# Patient Record
Sex: MALE | Race: WHITE
[De-identification: names, ages, dates, MRNs, and addresses within clinical notes are randomized per-mention and may not be internally consistent; named-entity substitution may affect disease eponyms.]

---

## 2020-01-25 ENCOUNTER — HOSPITAL ENCOUNTER (OUTPATIENT)
Dept: HOSPITAL 47 - LABWHC1 | Age: 70
Discharge: HOME | End: 2020-01-25
Attending: NURSE PRACTITIONER
Payer: MEDICARE

## 2020-01-25 DIAGNOSIS — E55.9: ICD-10-CM

## 2020-01-25 DIAGNOSIS — G47.30: ICD-10-CM

## 2020-01-25 DIAGNOSIS — R00.2: ICD-10-CM

## 2020-01-25 DIAGNOSIS — E78.00: ICD-10-CM

## 2020-01-25 DIAGNOSIS — I10: ICD-10-CM

## 2020-01-25 DIAGNOSIS — I25.10: Primary | ICD-10-CM

## 2020-01-25 DIAGNOSIS — E11.9: ICD-10-CM

## 2020-01-25 LAB
ANION GAP SERPL CALC-SCNC: 7.2 MMOL/L (ref 4–12)
BUN SERPL-SCNC: 18 MG/DL (ref 9–27)
BUN/CREAT SERPL: 22.5 RATIO (ref 12–20)
CALCIUM SPEC-MCNC: 9 MG/DL (ref 8.7–10.3)
CHLORIDE SERPL-SCNC: 106 MMOL/L (ref 96–109)
CHOLEST SERPL-MCNC: 147 MG/DL (ref 0–200)
CO2 SERPL-SCNC: 26.8 MMOL/L (ref 21.6–31.8)
ERYTHROCYTE [DISTWIDTH] IN BLOOD BY AUTOMATED COUNT: 4.92 M/UL (ref 4.3–5.9)
ERYTHROCYTE [DISTWIDTH] IN BLOOD: 12.3 % (ref 11.5–15.5)
GLUCOSE SERPL-MCNC: 149 MG/DL (ref 70–110)
HBA1C MFR BLD: 6.9 % (ref 4–6)
HCT VFR BLD AUTO: 43.1 % (ref 39–53)
HDLC SERPL-MCNC: 41 MG/DL (ref 40–60)
HGB BLD-MCNC: 14.3 GM/DL (ref 13–17.5)
LDLC SERPL CALC-MCNC: 68.2 MG/DL (ref 0–131)
MAGNESIUM SPEC-SCNC: 1.7 MG/DL (ref 1.5–2.4)
MCH RBC QN AUTO: 29.1 PG (ref 25–35)
MCHC RBC AUTO-ENTMCNC: 33.2 G/DL (ref 31–37)
MCV RBC AUTO: 87.5 FL (ref 80–100)
PLATELET # BLD AUTO: 255 K/UL (ref 150–450)
POTASSIUM SERPL-SCNC: 4.3 MMOL/L (ref 3.5–5.5)
SODIUM SERPL-SCNC: 140 MMOL/L (ref 135–145)
T4 FREE SERPL-MCNC: 1.1 NG/DL (ref 0.8–1.8)
TRIGL SERPL-MCNC: 189 MG/DL (ref 0–149)
VLDLC SERPL CALC-MCNC: 37.8 MG/DL (ref 5–40)
WBC # BLD AUTO: 6.4 K/UL (ref 3.8–10.6)

## 2020-01-25 PROCEDURE — 83735 ASSAY OF MAGNESIUM: CPT

## 2020-01-25 PROCEDURE — 84443 ASSAY THYROID STIM HORMONE: CPT

## 2020-01-25 PROCEDURE — 85027 COMPLETE CBC AUTOMATED: CPT

## 2020-01-25 PROCEDURE — 80061 LIPID PANEL: CPT

## 2020-01-25 PROCEDURE — 82306 VITAMIN D 25 HYDROXY: CPT

## 2020-01-25 PROCEDURE — 84439 ASSAY OF FREE THYROXINE: CPT

## 2020-01-25 PROCEDURE — 80048 BASIC METABOLIC PNL TOTAL CA: CPT

## 2020-01-25 PROCEDURE — 36415 COLL VENOUS BLD VENIPUNCTURE: CPT

## 2020-01-25 PROCEDURE — 83036 HEMOGLOBIN GLYCOSYLATED A1C: CPT

## 2020-08-07 ENCOUNTER — HOSPITAL ENCOUNTER (INPATIENT)
Dept: HOSPITAL 47 - EC | Age: 70
Discharge: ANOTHER HOSPITAL | DRG: 65 | End: 2020-08-07
Attending: INTERNAL MEDICINE | Admitting: INTERNAL MEDICINE
Payer: MEDICARE

## 2020-08-07 VITALS
SYSTOLIC BLOOD PRESSURE: 200 MMHG | RESPIRATION RATE: 18 BRPM | TEMPERATURE: 98.2 F | DIASTOLIC BLOOD PRESSURE: 100 MMHG | HEART RATE: 55 BPM

## 2020-08-07 DIAGNOSIS — H53.8: ICD-10-CM

## 2020-08-07 DIAGNOSIS — H42: ICD-10-CM

## 2020-08-07 DIAGNOSIS — Z79.82: ICD-10-CM

## 2020-08-07 DIAGNOSIS — J01.41: ICD-10-CM

## 2020-08-07 DIAGNOSIS — E11.42: ICD-10-CM

## 2020-08-07 DIAGNOSIS — E87.1: ICD-10-CM

## 2020-08-07 DIAGNOSIS — R40.2362: ICD-10-CM

## 2020-08-07 DIAGNOSIS — Z79.02: ICD-10-CM

## 2020-08-07 DIAGNOSIS — Z83.3: ICD-10-CM

## 2020-08-07 DIAGNOSIS — Z79.899: ICD-10-CM

## 2020-08-07 DIAGNOSIS — I11.9: ICD-10-CM

## 2020-08-07 DIAGNOSIS — H40.9: ICD-10-CM

## 2020-08-07 DIAGNOSIS — E78.5: ICD-10-CM

## 2020-08-07 DIAGNOSIS — R40.2252: ICD-10-CM

## 2020-08-07 DIAGNOSIS — R40.2142: ICD-10-CM

## 2020-08-07 DIAGNOSIS — I25.10: ICD-10-CM

## 2020-08-07 DIAGNOSIS — R47.81: ICD-10-CM

## 2020-08-07 DIAGNOSIS — R20.2: ICD-10-CM

## 2020-08-07 DIAGNOSIS — Z87.19: ICD-10-CM

## 2020-08-07 DIAGNOSIS — G45.0: ICD-10-CM

## 2020-08-07 DIAGNOSIS — R42: ICD-10-CM

## 2020-08-07 DIAGNOSIS — R29.700: ICD-10-CM

## 2020-08-07 DIAGNOSIS — G47.33: ICD-10-CM

## 2020-08-07 DIAGNOSIS — Z82.49: ICD-10-CM

## 2020-08-07 DIAGNOSIS — Z80.6: ICD-10-CM

## 2020-08-07 DIAGNOSIS — G46.3: ICD-10-CM

## 2020-08-07 DIAGNOSIS — E11.39: ICD-10-CM

## 2020-08-07 DIAGNOSIS — Z87.891: ICD-10-CM

## 2020-08-07 DIAGNOSIS — I63.213: Primary | ICD-10-CM

## 2020-08-07 DIAGNOSIS — Z79.51: ICD-10-CM

## 2020-08-07 DIAGNOSIS — R26.89: ICD-10-CM

## 2020-08-07 DIAGNOSIS — Z79.84: ICD-10-CM

## 2020-08-07 DIAGNOSIS — Z95.5: ICD-10-CM

## 2020-08-07 LAB
ALBUMIN SERPL-MCNC: 3.1 G/DL (ref 3.5–5)
ALP SERPL-CCNC: 60 U/L (ref 38–126)
ALT SERPL-CCNC: 29 U/L (ref 4–49)
ANION GAP SERPL CALC-SCNC: 4 MMOL/L
APTT BLD: 23.1 SEC (ref 22–30)
AST SERPL-CCNC: 28 U/L (ref 17–59)
BASOPHILS # BLD AUTO: 0.1 K/UL (ref 0–0.2)
BASOPHILS NFR BLD AUTO: 1 %
BUN SERPL-SCNC: 20 MG/DL (ref 9–20)
CALCIUM SPEC-MCNC: 8.8 MG/DL (ref 8.4–10.2)
CHLORIDE SERPL-SCNC: 106 MMOL/L (ref 98–107)
CO2 SERPL-SCNC: 26 MMOL/L (ref 22–30)
EOSINOPHIL # BLD AUTO: 0.2 K/UL (ref 0–0.7)
EOSINOPHIL NFR BLD AUTO: 2 %
ERYTHROCYTE [DISTWIDTH] IN BLOOD BY AUTOMATED COUNT: 5.07 M/UL (ref 4.3–5.9)
ERYTHROCYTE [DISTWIDTH] IN BLOOD: 12.6 % (ref 11.5–15.5)
GLUCOSE SERPL-MCNC: 166 MG/DL (ref 74–99)
HCT VFR BLD AUTO: 44.1 % (ref 39–53)
HGB BLD-MCNC: 14.7 GM/DL (ref 13–17.5)
INR PPP: 0.9 (ref ?–1.2)
LYMPHOCYTES # SPEC AUTO: 1.5 K/UL (ref 1–4.8)
LYMPHOCYTES NFR SPEC AUTO: 20 %
MCH RBC QN AUTO: 29 PG (ref 25–35)
MCHC RBC AUTO-ENTMCNC: 33.4 G/DL (ref 31–37)
MCV RBC AUTO: 86.9 FL (ref 80–100)
MONOCYTES # BLD AUTO: 0.7 K/UL (ref 0–1)
MONOCYTES NFR BLD AUTO: 9 %
NEUTROPHILS # BLD AUTO: 5.2 K/UL (ref 1.3–7.7)
NEUTROPHILS NFR BLD AUTO: 67 %
PLATELET # BLD AUTO: 233 K/UL (ref 150–450)
POTASSIUM SERPL-SCNC: 4.3 MMOL/L (ref 3.5–5.1)
PROT SERPL-MCNC: 5.9 G/DL (ref 6.3–8.2)
PT BLD: 9.7 SEC (ref 9–12)
SODIUM SERPL-SCNC: 136 MMOL/L (ref 137–145)
WBC # BLD AUTO: 7.8 K/UL (ref 3.8–10.6)

## 2020-08-07 PROCEDURE — 70551 MRI BRAIN STEM W/O DYE: CPT

## 2020-08-07 PROCEDURE — 80053 COMPREHEN METABOLIC PANEL: CPT

## 2020-08-07 PROCEDURE — 96360 HYDRATION IV INFUSION INIT: CPT

## 2020-08-07 PROCEDURE — 85730 THROMBOPLASTIN TIME PARTIAL: CPT

## 2020-08-07 PROCEDURE — 85610 PROTHROMBIN TIME: CPT

## 2020-08-07 PROCEDURE — 85025 COMPLETE CBC W/AUTO DIFF WBC: CPT

## 2020-08-07 PROCEDURE — 96361 HYDRATE IV INFUSION ADD-ON: CPT

## 2020-08-07 PROCEDURE — 93306 TTE W/DOPPLER COMPLETE: CPT

## 2020-08-07 PROCEDURE — 70450 CT HEAD/BRAIN W/O DYE: CPT

## 2020-08-07 PROCEDURE — 93005 ELECTROCARDIOGRAM TRACING: CPT

## 2020-08-07 PROCEDURE — 70498 CT ANGIOGRAPHY NECK: CPT

## 2020-08-07 PROCEDURE — 36415 COLL VENOUS BLD VENIPUNCTURE: CPT

## 2020-08-07 PROCEDURE — 84484 ASSAY OF TROPONIN QUANT: CPT

## 2020-08-07 PROCEDURE — 70496 CT ANGIOGRAPHY HEAD: CPT

## 2020-08-07 PROCEDURE — 99285 EMERGENCY DEPT VISIT HI MDM: CPT

## 2021-05-14 ENCOUNTER — APPOINTMENT (OUTPATIENT)
Dept: GENERAL RADIOLOGY | Age: 71
End: 2021-05-14
Payer: MEDICARE

## 2021-05-14 ENCOUNTER — HOSPITAL ENCOUNTER (EMERGENCY)
Age: 71
Discharge: HOME | End: 2021-05-15
Attending: FAMILY MEDICINE
Payer: MEDICARE

## 2021-05-14 ENCOUNTER — APPOINTMENT (OUTPATIENT)
Dept: CT IMAGING | Age: 71
End: 2021-05-14
Payer: MEDICARE

## 2021-05-14 VITALS
HEART RATE: 55 BPM | TEMPERATURE: 97.6 F | RESPIRATION RATE: 16 BRPM | HEIGHT: 69 IN | DIASTOLIC BLOOD PRESSURE: 93 MMHG | BODY MASS INDEX: 29.62 KG/M2 | OXYGEN SATURATION: 94 % | SYSTOLIC BLOOD PRESSURE: 166 MMHG | WEIGHT: 200 LBS

## 2021-05-14 DIAGNOSIS — R55 SYNCOPE AND COLLAPSE: Primary | ICD-10-CM

## 2021-05-14 LAB
ALBUMIN SERPL-MCNC: 2.1 G/DL (ref 3.5–5.1)
ALP BLD-CCNC: 51 U/L (ref 38–126)
ALT SERPL-CCNC: 30 U/L (ref 11–66)
ANION GAP SERPL CALCULATED.3IONS-SCNC: 6 MEQ/L (ref 8–16)
AST SERPL-CCNC: 32 U/L (ref 5–40)
BASOPHILS # BLD: 0.5 %
BASOPHILS ABSOLUTE: 0.1 THOU/MM3 (ref 0–0.1)
BILIRUB SERPL-MCNC: < 0.2 MG/DL (ref 0.3–1.2)
BUN BLDV-MCNC: 33 MG/DL (ref 7–22)
CALCIUM SERPL-MCNC: 8.1 MG/DL (ref 8.5–10.5)
CHLORIDE BLD-SCNC: 108 MEQ/L (ref 98–111)
CO2: 25 MEQ/L (ref 23–33)
CREAT SERPL-MCNC: 1.4 MG/DL (ref 0.4–1.2)
EOSINOPHIL # BLD: 1.9 %
EOSINOPHILS ABSOLUTE: 0.2 THOU/MM3 (ref 0–0.4)
ERYTHROCYTE [DISTWIDTH] IN BLOOD BY AUTOMATED COUNT: 13 % (ref 11.5–14.5)
ERYTHROCYTE [DISTWIDTH] IN BLOOD BY AUTOMATED COUNT: 41.9 FL (ref 35–45)
GFR SERPL CREATININE-BSD FRML MDRD: 50 ML/MIN/1.73M2
GLUCOSE BLD-MCNC: 154 MG/DL (ref 70–108)
HCT VFR BLD CALC: 41.7 % (ref 42–52)
HEMOGLOBIN: 13.7 GM/DL (ref 14–18)
IMMATURE GRANS (ABS): 0.03 THOU/MM3 (ref 0–0.07)
IMMATURE GRANULOCYTES: 0.2 %
LYMPHOCYTES # BLD: 13.4 %
LYMPHOCYTES ABSOLUTE: 1.6 THOU/MM3 (ref 1–4.8)
MCH RBC QN AUTO: 29.2 PG (ref 26–33)
MCHC RBC AUTO-ENTMCNC: 32.9 GM/DL (ref 32.2–35.5)
MCV RBC AUTO: 88.9 FL (ref 80–94)
MONOCYTES # BLD: 7.9 %
MONOCYTES ABSOLUTE: 1 THOU/MM3 (ref 0.4–1.3)
NUCLEATED RED BLOOD CELLS: 0 /100 WBC
OSMOLALITY CALCULATION: 287.9 MOSMOL/KG (ref 275–300)
PLATELET # BLD: 286 THOU/MM3 (ref 130–400)
PMV BLD AUTO: 11.2 FL (ref 9.4–12.4)
POTASSIUM REFLEX MAGNESIUM: 4.6 MEQ/L (ref 3.5–5.2)
PRO-BNP: 1861 PG/ML (ref 0–900)
RBC # BLD: 4.69 MILL/MM3 (ref 4.7–6.1)
SEG NEUTROPHILS: 76.1 %
SEGMENTED NEUTROPHILS ABSOLUTE COUNT: 9.2 THOU/MM3 (ref 1.8–7.7)
SODIUM BLD-SCNC: 139 MEQ/L (ref 135–145)
TOTAL PROTEIN: 5.1 G/DL (ref 6.1–8)
TROPONIN T: 0.05 NG/ML
WBC # BLD: 12.1 THOU/MM3 (ref 4.8–10.8)

## 2021-05-14 PROCEDURE — 80053 COMPREHEN METABOLIC PANEL: CPT

## 2021-05-14 PROCEDURE — 99285 EMERGENCY DEPT VISIT HI MDM: CPT

## 2021-05-14 PROCEDURE — 85025 COMPLETE CBC W/AUTO DIFF WBC: CPT

## 2021-05-14 PROCEDURE — 36415 COLL VENOUS BLD VENIPUNCTURE: CPT

## 2021-05-14 PROCEDURE — 71045 X-RAY EXAM CHEST 1 VIEW: CPT

## 2021-05-14 PROCEDURE — 82550 ASSAY OF CK (CPK): CPT

## 2021-05-14 PROCEDURE — 83880 ASSAY OF NATRIURETIC PEPTIDE: CPT

## 2021-05-14 PROCEDURE — 84484 ASSAY OF TROPONIN QUANT: CPT

## 2021-05-14 PROCEDURE — 93005 ELECTROCARDIOGRAM TRACING: CPT | Performed by: FAMILY MEDICINE

## 2021-05-14 PROCEDURE — 70450 CT HEAD/BRAIN W/O DYE: CPT

## 2021-05-14 RX ORDER — LISINOPRIL 10 MG/1
10 TABLET ORAL 2 TIMES DAILY
COMMUNITY

## 2021-05-14 RX ORDER — ATENOLOL 25 MG/1
25 TABLET ORAL 2 TIMES DAILY
COMMUNITY

## 2021-05-14 RX ORDER — M-VIT,TX,IRON,MINS/CALC/FOLIC 27MG-0.4MG
1 TABLET ORAL DAILY
COMMUNITY

## 2021-05-14 RX ORDER — ATORVASTATIN CALCIUM 80 MG/1
80 TABLET, FILM COATED ORAL NIGHTLY
COMMUNITY

## 2021-05-14 RX ORDER — ASPIRIN 81 MG/1
81 TABLET ORAL DAILY
COMMUNITY

## 2021-05-14 RX ORDER — BRIMONIDINE TARTRATE, TIMOLOL MALEATE 2; 5 MG/ML; MG/ML
1 SOLUTION/ DROPS OPHTHALMIC EVERY 12 HOURS
COMMUNITY

## 2021-05-14 RX ORDER — SITAGLIPTIN AND METFORMIN HYDROCHLORIDE 1000; 50 MG/1; MG/1
1 TABLET, FILM COATED ORAL 2 TIMES DAILY WITH MEALS
COMMUNITY

## 2021-05-15 LAB
TOTAL CK: 256 U/L (ref 55–170)
TROPONIN T: 0.05 NG/ML

## 2021-05-15 PROCEDURE — 84484 ASSAY OF TROPONIN QUANT: CPT

## 2021-05-15 PROCEDURE — 36415 COLL VENOUS BLD VENIPUNCTURE: CPT

## 2021-05-15 NOTE — ED NOTES
Pt resting in bed, family at bedside. Respirations even and unlabored.       Kim Shea RN  05/14/21 3252

## 2021-05-15 NOTE — ED NOTES
Bed: 021A  Expected date: 5/14/21  Expected time: 9:30 PM  Means of arrival: [place] [address] [facility]  Comments:     Erin Massey RN  05/14/21 0079

## 2021-05-15 NOTE — ED TRIAGE NOTES
Pt presents to the ED via EMS with c/o loss of consciousness/blank stare. Per EMS, they were called due to pt having a blank stare per pt daughter. Pt states he does not remember what happened, just woke up to EMS at the house. Pt states he was sitting in a chair prior to the event occurring. Pt states this has never happened before. Pt reports history of stents and stroke last year. Pt denies history of stroke. Pt takes Aspirin and Brilinta. Per EMS, blood pressure was 170-190s. EMS gave 1 Nitro. EMS reports pt has been bradycardic. [place] from [place]

## 2021-05-15 NOTE — ED PROVIDER NOTES
[address] COMPLAINT   Chief Complaint   Patient presents with    Loss of Consciousness        HPI   Vinh Najera is a 73 y.o. male who presents after syncope. It was witnessed by family members while he was standing in a stable, first staring spell, then syncope. He just traveled from outside of [address] [place] [address] to [employer] to show horses and is not from here. Nonetheless, he has no prior hx of syncope, or seizures. He feels fine now, but he did not recall any antecedent triggers or events leading up to his presentation. He has no symptoms currently. REVIEW OF SYSTEMS   Cardiac: No Chest Pain, +syncope; no palpitations  Neurologic: +staring activity; possible seizure; no Headache  Respiratory: No SOB  GI: No abdominal pain or vomiting   General: Denies Fever  All other review of systems otherwise negative.      PAST MEDICAL & SURGICAL HISTORY   Past Medical History:   Diagnosis Date    [facility]     Diabetes mellitus ([facility]     Hyperlipidemia     Hypertension      Past Surgical History:   Procedure Laterality Date    CORONARY ANGIOPLASTY      CORONARY ANGIOPLASTY WITH STENT PLACEMENT        CURRENT MEDICATIONS   Current Outpatient Rx   Medication Sig Dispense Refill    atorvastatin (LIPITOR) 80 MG tablet Take 80 mg by mouth nightly      sitaGLIPtan-metFORMIN (JANUMET)  MG per tablet Take 1 tablet by mouth 2 times daily (with meals) Morning and night      lisinopril (PRINIVIL;ZESTRIL) 10 MG tablet Take 10 mg by mouth 2 times daily      atenolol (TENORMIN) 25 MG tablet Take 25 mg by mouth 2 times daily      mometasone-formoterol (DULERA) 200-5 MCG/ACT inhaler Inhale 2 puffs into the lungs every 12 hours      Multiple Vitamins-Minerals (THERAPEUTIC MULTIVITAMIN-MINERALS) tablet Take 1 tablet by mouth daily      ticagrelor (BRILINTA) 90 MG TABS tablet Take 90 mg by mouth 2 times daily      aspirin (ASPIRIN 81) 81 MG EC tablet Take 81 mg by mouth daily      brimonidine-timolol (COMBIGAN) 0.2-0.5 % ophthalmic solution Place 1 drop into both eyes every 12 hours        ALLERGIES   No Known Allergies   SOCIAL & FAMILY HISTORY   Social History     Socioeconomic History    Marital status:      Spouse name: Not on file    Number of children: Not on file    Years of education: Not on file    Highest education level: Not on file   Occupational History    Not on file   Social Needs    Financial resource strain: Not on file    Food insecurity     Worry: Not on file     Inability: Not on file    Transportation needs     Medical: Not on file     Non-medical: Not on file   Tobacco Use    Smoking status: Not on file   Substance and Sexual Activity    Alcohol use: Not on file    Drug use: Not on file    Sexual activity: Not on file   Lifestyle    Physical activity     Days per week: Not on file     Minutes per session: Not on file    Stress: Not on file   Relationships    Social connections     Talks on phone: Not on file     Gets together: Not on file     Attends Buddhist service: Not on file     Active member of club or organization: Not on file     Attends meetings of clubs or organizations: Not on file     Relationship status: Not on file    Intimate partner violence     Fear of current or ex partner: Not on file     Emotionally abused: Not on file     Physically abused: Not on file     Forced sexual activity: Not on file   Other Topics Concern    Not on file   Social History Narrative    Not on file     No family history on file.      PHYSICAL EXAM   VITAL SIGNS: BP (!) 166/93   Pulse 55   Temp 97.6 °F (36.4 °C) (Oral)   Resp 16   Ht 5' 9\" (1.753 m)   Wt 200 lb (90.7 kg)   SpO2 94%   BMI 29.53 kg/m²    Constitutional: Well developed, well nourished, no acute distress   HENT: Atraumatic, moist mucus membranes, pupils equally round and reactive to light, extraocular movements intact  Neck: supple, no JVD   Respiratory: Lungs Clear, no retractions   Cardiovascular: Reg rate, normal rhythm, no murmurs  Vascular: Radial pulses 2+ equal bilaterally  GI: Soft, nontender, normal bowel sounds  Musculoskeletal: No edema, no deformities  Integument: Skin warm and dry, no petechiae   Neurologic: Alert & oriented, no pronator drift, speech is normal, no slurring, no facial droop,  No truncal ataxia, normal strength in both upper and lower extremities  Psych: Pleasant affect, no hallucinations     EKG (interpreted by me) Interpretation 5/14/21 21:45  Rhythm: normal sinus   Rate: 53 BPM  Axis: normal  Ectopy: none  Conduction: normal  ST/T Segments: no acute change  Clinical Impression: nonspecific    RADIOLOGY/PROCEDURES   CT Head WO Contrast   Final Result   No acute intracranial findings. Nonemergent/incidental findings in the report. This document has been electronically signed by: Acey Schirmer, MD on    05/14/2021 11:55 PM      All CTs at this facility use dose modulation techniques and iterative    reconstructions, and/or weight-based dosing   when appropriate to reduce radiation to a low as reasonably achievable. XR CHEST PORTABLE   Final Result   No acute findings. This document has been electronically signed by: Acey Schirmer, MD on    05/14/2021 11:37 PM          ED COURSE & MEDICAL DECISION MAKING   Pertinent Labs & Imaging studies reviewed and interpreted. (See chart for details)  See chart for details of medications given during the ED stay. Vitals:    05/14/21 2348   BP: (!) 166/93   Pulse: 55   Resp: 16   Temp:    SpO2: 94%       Consultation: pending workup    Differential Diagnosis: Cardiac arrhythmia, drop attack from a subarachnoid hemorrhage, sepsis, infection, anemia, Myocardial Infarction, seizure, vasovagal syncope, other. FINAL IMPRESSION   1. Syncope and collapse        Plan: MDM - pt has no prior hx of seizures or cardiac arrhythmia, but is on Brilinta for his ACS with stents.  Pt has no fever, focal neuro deficits or other concerns at this time. Workup neg. Repeat trop materially the same compared to first trop. Via shared decision making, pt was deemed stable for dc and given anticipatory guidance on when to return.         Shahriar Puri MD  05/15/21 4503

## 2021-05-16 LAB
EKG ATRIAL RATE: 53 BPM
EKG P AXIS: 13 DEGREES
EKG P-R INTERVAL: 160 MS
EKG Q-T INTERVAL: 466 MS
EKG QRS DURATION: 80 MS
EKG QTC CALCULATION (BAZETT): 437 MS
EKG R AXIS: 45 DEGREES
EKG T AXIS: 13 DEGREES
EKG VENTRICULAR RATE: 53 BPM

## 2021-05-19 ENCOUNTER — HOSPITAL ENCOUNTER (OUTPATIENT)
Age: 71
End: 2021-05-19
Payer: MEDICARE

## 2021-05-19 PROCEDURE — 99211 OFF/OP EST MAY X REQ PHY/QHP: CPT

## 2021-06-21 ENCOUNTER — HOSPITAL ENCOUNTER (OUTPATIENT)
Dept: HOSPITAL 47 - RADMRIMAIN | Age: 71
Discharge: HOME | End: 2021-06-21
Attending: PSYCHIATRY & NEUROLOGY
Payer: MEDICARE

## 2021-06-21 DIAGNOSIS — G93.89: ICD-10-CM

## 2021-06-21 DIAGNOSIS — I63.9: Primary | ICD-10-CM

## 2021-06-21 LAB — BUN SERPL-SCNC: 31 MG/DL (ref 9–20)

## 2021-06-21 PROCEDURE — 82565 ASSAY OF CREATININE: CPT

## 2021-06-21 PROCEDURE — 84520 ASSAY OF UREA NITROGEN: CPT

## 2021-06-21 PROCEDURE — 70551 MRI BRAIN STEM W/O DYE: CPT

## 2021-07-08 ENCOUNTER — HOSPITAL ENCOUNTER (OUTPATIENT)
Dept: HOSPITAL 47 - RADCTMAIN | Age: 71
Discharge: HOME | End: 2021-07-08
Attending: PSYCHIATRY & NEUROLOGY
Payer: MEDICARE

## 2021-07-08 DIAGNOSIS — Z53.9: Primary | ICD-10-CM

## 2021-07-08 LAB — BUN SERPL-SCNC: 37 MG/DL (ref 9–20)

## 2021-07-08 PROCEDURE — 84520 ASSAY OF UREA NITROGEN: CPT

## 2021-07-08 PROCEDURE — 82565 ASSAY OF CREATININE: CPT

## 2021-11-10 ENCOUNTER — HOSPITAL ENCOUNTER (OUTPATIENT)
Dept: HOSPITAL 47 - SLEEP | Age: 71
End: 2021-11-10
Attending: INTERNAL MEDICINE
Payer: MEDICARE

## 2021-11-10 DIAGNOSIS — I10: ICD-10-CM

## 2021-11-10 DIAGNOSIS — E11.9: ICD-10-CM

## 2021-11-10 DIAGNOSIS — T78.40XA: ICD-10-CM

## 2021-11-10 DIAGNOSIS — G47.36: ICD-10-CM

## 2021-11-10 DIAGNOSIS — J45.909: ICD-10-CM

## 2021-11-10 DIAGNOSIS — Z79.899: ICD-10-CM

## 2021-11-10 DIAGNOSIS — Z79.82: ICD-10-CM

## 2021-11-10 DIAGNOSIS — Z99.89: ICD-10-CM

## 2021-11-10 DIAGNOSIS — I25.10: ICD-10-CM

## 2021-11-10 DIAGNOSIS — H40.059: ICD-10-CM

## 2021-11-10 DIAGNOSIS — Z86.73: ICD-10-CM

## 2021-11-10 DIAGNOSIS — Z95.5: ICD-10-CM

## 2021-11-10 DIAGNOSIS — G47.33: Primary | ICD-10-CM

## 2021-11-10 DIAGNOSIS — E78.5: ICD-10-CM

## 2021-11-29 ENCOUNTER — HOSPITAL ENCOUNTER (OUTPATIENT)
Dept: HOSPITAL 47 - RADUSWWP | Age: 71
Discharge: HOME | End: 2021-11-29
Attending: INTERNAL MEDICINE
Payer: MEDICARE

## 2021-11-29 DIAGNOSIS — N18.30: Primary | ICD-10-CM

## 2021-11-29 DIAGNOSIS — N28.1: ICD-10-CM

## 2021-11-29 PROCEDURE — 76770 US EXAM ABDO BACK WALL COMP: CPT

## 2023-04-03 ENCOUNTER — HOSPITAL ENCOUNTER (OUTPATIENT)
Dept: HOSPITAL 47 - 6NMEDSUR | Age: 73
Setting detail: OBSERVATION
LOS: 1 days | Discharge: HOME | End: 2023-04-04
Attending: INTERNAL MEDICINE | Admitting: INTERNAL MEDICINE
Payer: MEDICARE

## 2023-04-03 DIAGNOSIS — N18.9: ICD-10-CM

## 2023-04-03 DIAGNOSIS — I25.110: Primary | ICD-10-CM

## 2023-04-03 DIAGNOSIS — Z79.51: ICD-10-CM

## 2023-04-03 DIAGNOSIS — I25.84: ICD-10-CM

## 2023-04-03 DIAGNOSIS — Z95.5: ICD-10-CM

## 2023-04-03 DIAGNOSIS — G47.33: ICD-10-CM

## 2023-04-03 DIAGNOSIS — I12.9: ICD-10-CM

## 2023-04-03 DIAGNOSIS — E11.22: ICD-10-CM

## 2023-04-03 DIAGNOSIS — Z79.899: ICD-10-CM

## 2023-04-03 DIAGNOSIS — Z79.84: ICD-10-CM

## 2023-04-03 DIAGNOSIS — Z79.82: ICD-10-CM

## 2023-04-03 DIAGNOSIS — E78.00: ICD-10-CM

## 2023-04-03 DIAGNOSIS — Z79.4: ICD-10-CM

## 2023-04-03 DIAGNOSIS — E78.5: ICD-10-CM

## 2023-04-03 LAB
ANION GAP SERPL CALC-SCNC: 9 MMOL/L
BASOPHILS # BLD AUTO: 0 K/UL (ref 0–0.2)
BASOPHILS NFR BLD AUTO: 0 %
BUN SERPL-SCNC: 52 MG/DL (ref 9–20)
CALCIUM SPEC-MCNC: 9.2 MG/DL (ref 8.4–10.2)
CHLORIDE SERPL-SCNC: 105 MMOL/L (ref 98–107)
CO2 SERPL-SCNC: 21 MMOL/L (ref 22–30)
EOSINOPHIL # BLD AUTO: 0.4 K/UL (ref 0–0.7)
EOSINOPHIL NFR BLD AUTO: 4 %
ERYTHROCYTE [DISTWIDTH] IN BLOOD BY AUTOMATED COUNT: 4.7 M/UL (ref 4.3–5.9)
ERYTHROCYTE [DISTWIDTH] IN BLOOD: 13.9 % (ref 11.5–15.5)
GLUCOSE BLD-MCNC: 180 MG/DL (ref 70–110)
GLUCOSE SERPL-MCNC: 111 MG/DL (ref 74–99)
HCT VFR BLD AUTO: 39.8 % (ref 39–53)
HGB BLD-MCNC: 14.3 GM/DL (ref 13–17.5)
LYMPHOCYTES # SPEC AUTO: 2.4 K/UL (ref 1–4.8)
LYMPHOCYTES NFR SPEC AUTO: 24 %
MCH RBC QN AUTO: 30.4 PG (ref 25–35)
MCHC RBC AUTO-ENTMCNC: 35.9 G/DL (ref 31–37)
MCV RBC AUTO: 84.7 FL (ref 80–100)
MONOCYTES # BLD AUTO: 1.1 K/UL (ref 0–1)
MONOCYTES NFR BLD AUTO: 11 %
NEUTROPHILS # BLD AUTO: 5.9 K/UL (ref 1.3–7.7)
NEUTROPHILS NFR BLD AUTO: 58 %
PLATELET # BLD AUTO: 191 K/UL (ref 150–450)
POTASSIUM SERPL-SCNC: 5.4 MMOL/L (ref 3.5–5.1)
SODIUM SERPL-SCNC: 135 MMOL/L (ref 137–145)
WBC # BLD AUTO: 10.1 K/UL (ref 3.8–10.6)

## 2023-04-03 PROCEDURE — 84132 ASSAY OF SERUM POTASSIUM: CPT

## 2023-04-03 PROCEDURE — 93458 L HRT ARTERY/VENTRICLE ANGIO: CPT

## 2023-04-03 PROCEDURE — 80048 BASIC METABOLIC PNL TOTAL CA: CPT

## 2023-04-03 PROCEDURE — 83735 ASSAY OF MAGNESIUM: CPT

## 2023-04-03 PROCEDURE — 85025 COMPLETE CBC W/AUTO DIFF WBC: CPT

## 2023-04-03 RX ADMIN — CEFAZOLIN SCH MLS/HR: 330 INJECTION, POWDER, FOR SOLUTION INTRAMUSCULAR; INTRAVENOUS at 20:44

## 2023-04-03 RX ADMIN — BUDESONIDE AND FORMOTEROL FUMARATE DIHYDRATE SCH: 80; 4.5 AEROSOL RESPIRATORY (INHALATION) at 19:41

## 2023-04-03 RX ADMIN — CEFAZOLIN SCH MLS/HR: 330 INJECTION, POWDER, FOR SOLUTION INTRAMUSCULAR; INTRAVENOUS at 18:24

## 2023-04-04 VITALS
TEMPERATURE: 97.9 F | DIASTOLIC BLOOD PRESSURE: 74 MMHG | RESPIRATION RATE: 17 BRPM | SYSTOLIC BLOOD PRESSURE: 150 MMHG | HEART RATE: 52 BPM

## 2023-04-04 LAB
ANION GAP SERPL CALC-SCNC: 5 MMOL/L
BUN SERPL-SCNC: 38 MG/DL (ref 9–20)
CALCIUM SPEC-MCNC: 8.6 MG/DL (ref 8.4–10.2)
CHLORIDE SERPL-SCNC: 110 MMOL/L (ref 98–107)
CO2 SERPL-SCNC: 23 MMOL/L (ref 22–30)
GLUCOSE BLD-MCNC: 113 MG/DL (ref 70–110)
GLUCOSE BLD-MCNC: 90 MG/DL (ref 70–110)
GLUCOSE SERPL-MCNC: 112 MG/DL (ref 74–99)
MAGNESIUM SPEC-SCNC: 2.4 MG/DL (ref 1.6–2.3)
POTASSIUM SERPL-SCNC: 5.9 MMOL/L (ref 3.5–5.1)
SODIUM SERPL-SCNC: 138 MMOL/L (ref 137–145)

## 2023-04-04 RX ADMIN — TACROLIMUS SCH: 1 CAPSULE ORAL at 21:59

## 2023-04-04 RX ADMIN — TIMOLOL MALEATE SCH: 5 SOLUTION OPHTHALMIC at 08:04

## 2023-04-04 RX ADMIN — BRIMONIDINE TARTRATE SCH: 2 SOLUTION/ DROPS OPHTHALMIC at 21:59

## 2023-04-04 RX ADMIN — BUDESONIDE AND FORMOTEROL FUMARATE DIHYDRATE SCH: 80; 4.5 AEROSOL RESPIRATORY (INHALATION) at 08:13

## 2023-04-04 RX ADMIN — DEXTRAN 70 AND HYPROMELLOSE 2910 SCH: 1; 3 SOLUTION/ DROPS OPHTHALMIC at 08:04

## 2023-04-04 RX ADMIN — TACROLIMUS SCH MG: 1 CAPSULE ORAL at 10:37

## 2023-04-04 RX ADMIN — VERAPAMIL HYDROCHLORIDE ONE ML: 2.5 INJECTION, SOLUTION INTRAVENOUS at 11:43

## 2023-04-04 RX ADMIN — TIMOLOL MALEATE SCH: 5 SOLUTION OPHTHALMIC at 21:59

## 2023-04-04 RX ADMIN — BUDESONIDE AND FORMOTEROL FUMARATE DIHYDRATE SCH: 80; 4.5 AEROSOL RESPIRATORY (INHALATION) at 18:38

## 2023-04-04 RX ADMIN — MIDAZOLAM ONE MG: 1 INJECTION INTRAMUSCULAR; INTRAVENOUS at 11:12

## 2023-04-04 RX ADMIN — MIDAZOLAM ONE MG: 1 INJECTION INTRAMUSCULAR; INTRAVENOUS at 11:17

## 2023-04-04 RX ADMIN — DEXTRAN 70 AND HYPROMELLOSE 2910 SCH: 1; 3 SOLUTION/ DROPS OPHTHALMIC at 21:59

## 2023-04-04 RX ADMIN — BRIMONIDINE TARTRATE SCH: 2 SOLUTION/ DROPS OPHTHALMIC at 08:04

## 2023-04-04 RX ADMIN — VERAPAMIL HYDROCHLORIDE ONE ML: 2.5 INJECTION, SOLUTION INTRAVENOUS at 11:14

## 2023-04-04 RX ADMIN — CEFAZOLIN SCH MLS/HR: 330 INJECTION, POWDER, FOR SOLUTION INTRAMUSCULAR; INTRAVENOUS at 08:00

## 2023-04-08 ENCOUNTER — HOSPITAL ENCOUNTER (OUTPATIENT)
Dept: HOSPITAL 47 - LABWHC1 | Age: 73
Discharge: HOME | End: 2023-04-08
Attending: INTERNAL MEDICINE
Payer: MEDICARE

## 2023-04-08 DIAGNOSIS — N02.2: ICD-10-CM

## 2023-04-08 DIAGNOSIS — N18.4: Primary | ICD-10-CM

## 2023-04-08 LAB
ANION GAP SERPL CALC-SCNC: 9.8 MMOL/L (ref 10–18)
BUN SERPL-SCNC: 39.3 MG/DL (ref 9–27)
BUN/CREAT SERPL: 18.71 RATIO (ref 12–20)
CALCIUM SPEC-MCNC: 9.3 MG/DL (ref 8.7–10.3)
CHLORIDE SERPL-SCNC: 105 MMOL/L (ref 96–109)
CO2 SERPL-SCNC: 24.2 MMOL/L (ref 20–27.5)
GLUCOSE SERPL-MCNC: 125 MG/DL (ref 70–110)
MAGNESIUM SPEC-SCNC: 2.6 MG/DL (ref 1.5–2.4)
POTASSIUM SERPL-SCNC: 5.2 MMOL/L (ref 3.5–5.5)
SODIUM SERPL-SCNC: 139 MMOL/L (ref 135–145)

## 2023-04-08 PROCEDURE — 80197 ASSAY OF TACROLIMUS: CPT

## 2023-04-08 PROCEDURE — 36415 COLL VENOUS BLD VENIPUNCTURE: CPT

## 2023-04-08 PROCEDURE — 80048 BASIC METABOLIC PNL TOTAL CA: CPT

## 2023-04-08 PROCEDURE — 83735 ASSAY OF MAGNESIUM: CPT

## 2023-04-10 ENCOUNTER — HOSPITAL ENCOUNTER (INPATIENT)
Dept: HOSPITAL 47 - 3SCARD | Age: 73
LOS: 2 days | Discharge: HOME | DRG: 247 | End: 2023-04-12
Attending: INTERNAL MEDICINE | Admitting: INTERNAL MEDICINE
Payer: MEDICARE

## 2023-04-10 VITALS — BODY MASS INDEX: 27.2 KG/M2

## 2023-04-10 DIAGNOSIS — G47.33: ICD-10-CM

## 2023-04-10 DIAGNOSIS — I25.110: Primary | ICD-10-CM

## 2023-04-10 DIAGNOSIS — Z87.891: ICD-10-CM

## 2023-04-10 DIAGNOSIS — N18.9: ICD-10-CM

## 2023-04-10 DIAGNOSIS — Z79.4: ICD-10-CM

## 2023-04-10 DIAGNOSIS — I12.9: ICD-10-CM

## 2023-04-10 DIAGNOSIS — Z79.51: ICD-10-CM

## 2023-04-10 DIAGNOSIS — E11.22: ICD-10-CM

## 2023-04-10 DIAGNOSIS — Z28.310: ICD-10-CM

## 2023-04-10 DIAGNOSIS — E78.00: ICD-10-CM

## 2023-04-10 DIAGNOSIS — Z98.61: ICD-10-CM

## 2023-04-10 DIAGNOSIS — Z79.02: ICD-10-CM

## 2023-04-10 DIAGNOSIS — Z79.84: ICD-10-CM

## 2023-04-10 DIAGNOSIS — Z79.82: ICD-10-CM

## 2023-04-10 LAB
ANION GAP SERPL CALC-SCNC: 9 MMOL/L
BASOPHILS # BLD AUTO: 0.1 K/UL (ref 0–0.2)
BASOPHILS NFR BLD AUTO: 1 %
BUN SERPL-SCNC: 41 MG/DL (ref 9–20)
CALCIUM SPEC-MCNC: 8.7 MG/DL (ref 8.4–10.2)
CHLORIDE SERPL-SCNC: 105 MMOL/L (ref 98–107)
CO2 SERPL-SCNC: 24 MMOL/L (ref 22–30)
EOSINOPHIL # BLD AUTO: 0.2 K/UL (ref 0–0.7)
EOSINOPHIL NFR BLD AUTO: 2 %
ERYTHROCYTE [DISTWIDTH] IN BLOOD BY AUTOMATED COUNT: 4.51 M/UL (ref 4.3–5.9)
ERYTHROCYTE [DISTWIDTH] IN BLOOD: 13.7 % (ref 11.5–15.5)
GLUCOSE BLD-MCNC: 115 MG/DL (ref 70–110)
GLUCOSE BLD-MCNC: 158 MG/DL (ref 70–110)
GLUCOSE SERPL-MCNC: 193 MG/DL (ref 74–99)
HCT VFR BLD AUTO: 39.1 % (ref 39–53)
HGB BLD-MCNC: 13.1 GM/DL (ref 13–17.5)
INR PPP: 1 (ref ?–1.2)
LYMPHOCYTES # SPEC AUTO: 2.3 K/UL (ref 1–4.8)
LYMPHOCYTES NFR SPEC AUTO: 24 %
MCH RBC QN AUTO: 29.1 PG (ref 25–35)
MCHC RBC AUTO-ENTMCNC: 33.5 G/DL (ref 31–37)
MCV RBC AUTO: 86.7 FL (ref 80–100)
MONOCYTES # BLD AUTO: 0.9 K/UL (ref 0–1)
MONOCYTES NFR BLD AUTO: 10 %
NEUTROPHILS # BLD AUTO: 5.6 K/UL (ref 1.3–7.7)
NEUTROPHILS NFR BLD AUTO: 60 %
PLATELET # BLD AUTO: 194 K/UL (ref 150–450)
POTASSIUM SERPL-SCNC: 5.1 MMOL/L (ref 3.5–5.1)
PT BLD: 10.5 SEC (ref 9–12)
SODIUM SERPL-SCNC: 138 MMOL/L (ref 137–145)
WBC # BLD AUTO: 9.4 K/UL (ref 3.8–10.6)

## 2023-04-10 PROCEDURE — 85610 PROTHROMBIN TIME: CPT

## 2023-04-10 PROCEDURE — 85025 COMPLETE CBC W/AUTO DIFF WBC: CPT

## 2023-04-10 PROCEDURE — 80048 BASIC METABOLIC PNL TOTAL CA: CPT

## 2023-04-10 RX ADMIN — EZETIMIBE SCH MG: 10 TABLET ORAL at 20:09

## 2023-04-10 RX ADMIN — BRIMONIDINE TARTRATE SCH DROPS: 2 SOLUTION/ DROPS OPHTHALMIC at 20:10

## 2023-04-10 RX ADMIN — CLOPIDOGREL BISULFATE SCH MG: 75 TABLET ORAL at 20:10

## 2023-04-10 RX ADMIN — TACROLIMUS SCH MG: 1 CAPSULE ORAL at 20:10

## 2023-04-10 RX ADMIN — ISOSORBIDE MONONITRATE SCH MG: 30 TABLET, EXTENDED RELEASE ORAL at 20:09

## 2023-04-10 RX ADMIN — CEFAZOLIN SCH MLS/HR: 330 INJECTION, POWDER, FOR SOLUTION INTRAMUSCULAR; INTRAVENOUS at 18:34

## 2023-04-10 RX ADMIN — TIMOLOL MALEATE SCH DROPS: 5 SOLUTION OPHTHALMIC at 20:10

## 2023-04-10 RX ADMIN — ATORVASTATIN CALCIUM SCH MG: 40 TABLET, FILM COATED ORAL at 20:10

## 2023-04-10 RX ADMIN — LATANOPROST SCH DROPS: 50 SOLUTION OPHTHALMIC at 20:10

## 2023-04-11 LAB
ANION GAP SERPL CALC-SCNC: 6 MMOL/L
BUN SERPL-SCNC: 34 MG/DL (ref 9–20)
CALCIUM SPEC-MCNC: 8.3 MG/DL (ref 8.4–10.2)
CHLORIDE SERPL-SCNC: 111 MMOL/L (ref 98–107)
CO2 SERPL-SCNC: 23 MMOL/L (ref 22–30)
GLUCOSE BLD-MCNC: 102 MG/DL (ref 70–110)
GLUCOSE BLD-MCNC: 116 MG/DL (ref 70–110)
GLUCOSE BLD-MCNC: 137 MG/DL (ref 70–110)
GLUCOSE BLD-MCNC: 196 MG/DL (ref 70–110)
GLUCOSE SERPL-MCNC: 114 MG/DL (ref 74–99)
POTASSIUM SERPL-SCNC: 4.9 MMOL/L (ref 3.5–5.1)
SODIUM SERPL-SCNC: 140 MMOL/L (ref 137–145)

## 2023-04-11 PROCEDURE — 02HK3JZ INSERTION OF PACEMAKER LEAD INTO RIGHT VENTRICLE, PERCUTANEOUS APPROACH: ICD-10-PCS

## 2023-04-11 PROCEDURE — 02C03Z7 EXTIRPATION OF MATTER FROM CORONARY ARTERY, ONE ARTERY, ORBITAL ATHERECTOMY TECHNIQUE, PERCUTANEOUS APPROACH: ICD-10-PCS

## 2023-04-11 PROCEDURE — 027034Z DILATION OF CORONARY ARTERY, ONE ARTERY WITH DRUG-ELUTING INTRALUMINAL DEVICE, PERCUTANEOUS APPROACH: ICD-10-PCS

## 2023-04-11 PROCEDURE — 5A1223Z PERFORMANCE OF CARDIAC PACING, CONTINUOUS: ICD-10-PCS

## 2023-04-11 RX ADMIN — BRIMONIDINE TARTRATE SCH: 2 SOLUTION/ DROPS OPHTHALMIC at 21:02

## 2023-04-11 RX ADMIN — THERA TABS SCH EACH: TAB at 06:23

## 2023-04-11 RX ADMIN — CLOPIDOGREL BISULFATE SCH MG: 75 TABLET ORAL at 20:59

## 2023-04-11 RX ADMIN — BRIMONIDINE TARTRATE SCH: 2 SOLUTION/ DROPS OPHTHALMIC at 06:27

## 2023-04-11 RX ADMIN — TACROLIMUS SCH: 1 CAPSULE ORAL at 06:26

## 2023-04-11 RX ADMIN — TIMOLOL MALEATE SCH DROPS: 5 SOLUTION OPHTHALMIC at 21:00

## 2023-04-11 RX ADMIN — ASPIRIN 81 MG CHEWABLE TABLET SCH MG: 81 TABLET CHEWABLE at 06:24

## 2023-04-11 RX ADMIN — LORATADINE SCH MG: 10 TABLET ORAL at 06:23

## 2023-04-11 RX ADMIN — ISOSORBIDE MONONITRATE SCH MG: 30 TABLET, EXTENDED RELEASE ORAL at 21:00

## 2023-04-11 RX ADMIN — BRIMONIDINE TARTRATE SCH DROPS: 2 SOLUTION/ DROPS OPHTHALMIC at 21:01

## 2023-04-11 RX ADMIN — ATORVASTATIN CALCIUM SCH MG: 40 TABLET, FILM COATED ORAL at 20:59

## 2023-04-11 RX ADMIN — LATANOPROST SCH DROPS: 50 SOLUTION OPHTHALMIC at 21:01

## 2023-04-11 RX ADMIN — ERGOCALCIFEROL SCH MCG: 1.25 CAPSULE ORAL at 06:29

## 2023-04-11 RX ADMIN — CEFAZOLIN SCH MLS/HR: 330 INJECTION, POWDER, FOR SOLUTION INTRAMUSCULAR; INTRAVENOUS at 13:30

## 2023-04-11 RX ADMIN — CEFAZOLIN SCH MLS/HR: 330 INJECTION, POWDER, FOR SOLUTION INTRAMUSCULAR; INTRAVENOUS at 06:32

## 2023-04-11 RX ADMIN — TIMOLOL MALEATE SCH DROPS: 5 SOLUTION OPHTHALMIC at 06:24

## 2023-04-11 RX ADMIN — EZETIMIBE SCH MG: 10 TABLET ORAL at 20:59

## 2023-04-11 RX ADMIN — TACROLIMUS SCH MG: 1 CAPSULE ORAL at 20:59

## 2023-04-12 VITALS
RESPIRATION RATE: 18 BRPM | SYSTOLIC BLOOD PRESSURE: 124 MMHG | DIASTOLIC BLOOD PRESSURE: 70 MMHG | TEMPERATURE: 97.5 F | HEART RATE: 60 BPM

## 2023-04-12 LAB
ANION GAP SERPL CALC-SCNC: 6 MMOL/L
BASOPHILS # BLD AUTO: 0 K/UL (ref 0–0.2)
BASOPHILS NFR BLD AUTO: 0 %
BUN SERPL-SCNC: 29 MG/DL (ref 9–20)
CALCIUM SPEC-MCNC: 8.6 MG/DL (ref 8.4–10.2)
CHLORIDE SERPL-SCNC: 111 MMOL/L (ref 98–107)
CO2 SERPL-SCNC: 23 MMOL/L (ref 22–30)
EOSINOPHIL # BLD AUTO: 0.4 K/UL (ref 0–0.7)
EOSINOPHIL NFR BLD AUTO: 4 %
ERYTHROCYTE [DISTWIDTH] IN BLOOD BY AUTOMATED COUNT: 4.55 M/UL (ref 4.3–5.9)
ERYTHROCYTE [DISTWIDTH] IN BLOOD: 13.9 % (ref 11.5–15.5)
GLUCOSE BLD-MCNC: 134 MG/DL (ref 70–110)
GLUCOSE SERPL-MCNC: 147 MG/DL (ref 74–99)
HCT VFR BLD AUTO: 39.6 % (ref 39–53)
HGB BLD-MCNC: 12.9 GM/DL (ref 13–17.5)
LYMPHOCYTES # SPEC AUTO: 1.5 K/UL (ref 1–4.8)
LYMPHOCYTES NFR SPEC AUTO: 16 %
MCH RBC QN AUTO: 28.4 PG (ref 25–35)
MCHC RBC AUTO-ENTMCNC: 32.6 G/DL (ref 31–37)
MCV RBC AUTO: 87.2 FL (ref 80–100)
MONOCYTES # BLD AUTO: 0.9 K/UL (ref 0–1)
MONOCYTES NFR BLD AUTO: 9 %
NEUTROPHILS # BLD AUTO: 6.5 K/UL (ref 1.3–7.7)
NEUTROPHILS NFR BLD AUTO: 69 %
PLATELET # BLD AUTO: 196 K/UL (ref 150–450)
POTASSIUM SERPL-SCNC: 4.9 MMOL/L (ref 3.5–5.1)
SODIUM SERPL-SCNC: 140 MMOL/L (ref 137–145)
WBC # BLD AUTO: 9.4 K/UL (ref 3.8–10.6)

## 2023-04-12 RX ADMIN — LORATADINE SCH MG: 10 TABLET ORAL at 08:37

## 2023-04-12 RX ADMIN — TACROLIMUS SCH MG: 1 CAPSULE ORAL at 08:37

## 2023-04-12 RX ADMIN — BRIMONIDINE TARTRATE SCH: 2 SOLUTION/ DROPS OPHTHALMIC at 08:37

## 2023-04-12 RX ADMIN — CEFAZOLIN SCH: 330 INJECTION, POWDER, FOR SOLUTION INTRAMUSCULAR; INTRAVENOUS at 01:28

## 2023-04-12 RX ADMIN — THERA TABS SCH EACH: TAB at 08:38

## 2023-04-12 RX ADMIN — TIMOLOL MALEATE SCH DROPS: 5 SOLUTION OPHTHALMIC at 08:38

## 2023-04-12 RX ADMIN — ERGOCALCIFEROL SCH MCG: 1.25 CAPSULE ORAL at 08:38

## 2023-04-12 RX ADMIN — ASPIRIN 81 MG CHEWABLE TABLET SCH MG: 81 TABLET CHEWABLE at 08:38

## 2023-05-17 ENCOUNTER — HOSPITAL ENCOUNTER (OUTPATIENT)
Dept: HOSPITAL 47 - SLEEP | Age: 73
End: 2023-05-17
Payer: MEDICARE

## 2023-05-17 DIAGNOSIS — E11.9: ICD-10-CM

## 2023-05-17 DIAGNOSIS — Z98.890: ICD-10-CM

## 2023-05-17 DIAGNOSIS — Z99.89: ICD-10-CM

## 2023-05-17 DIAGNOSIS — Z86.73: ICD-10-CM

## 2023-05-17 DIAGNOSIS — E78.5: ICD-10-CM

## 2023-05-17 DIAGNOSIS — G47.33: Primary | ICD-10-CM

## 2023-05-17 DIAGNOSIS — J45.909: ICD-10-CM

## 2023-05-17 DIAGNOSIS — I25.10: ICD-10-CM

## 2023-05-17 DIAGNOSIS — I10: ICD-10-CM

## 2023-05-17 DIAGNOSIS — Z79.51: ICD-10-CM

## 2023-05-17 DIAGNOSIS — Z87.891: ICD-10-CM

## 2023-05-17 PROCEDURE — 99212 OFFICE O/P EST SF 10 MIN: CPT

## 2023-06-14 ENCOUNTER — HOSPITAL ENCOUNTER (OUTPATIENT)
Dept: HOSPITAL 47 - LABPAT | Age: 73
Discharge: HOME | End: 2023-06-14
Attending: INTERNAL MEDICINE
Payer: MEDICARE

## 2023-06-14 DIAGNOSIS — I25.2: ICD-10-CM

## 2023-06-14 DIAGNOSIS — Z01.812: Primary | ICD-10-CM

## 2023-06-14 DIAGNOSIS — I20.9: ICD-10-CM

## 2023-06-14 LAB
ANION GAP SERPL CALC-SCNC: 15.7 MMOL/L (ref 4–12)
BUN SERPL-SCNC: 41.2 MG/DL (ref 9–27)
CHLORIDE SERPL-SCNC: 101 MMOL/L (ref 96–109)
CO2 SERPL-SCNC: 21.3 MMOL/L (ref 21.6–31.8)
POTASSIUM SERPL-SCNC: 4.6 MMOL/L (ref 3.5–5.5)
SODIUM SERPL-SCNC: 138 MMOL/L (ref 135–145)

## 2023-06-14 PROCEDURE — 80051 ELECTROLYTE PANEL: CPT

## 2023-06-14 PROCEDURE — 82565 ASSAY OF CREATININE: CPT

## 2023-06-14 PROCEDURE — 36415 COLL VENOUS BLD VENIPUNCTURE: CPT

## 2023-06-14 PROCEDURE — 85027 COMPLETE CBC AUTOMATED: CPT

## 2023-06-14 PROCEDURE — 84520 ASSAY OF UREA NITROGEN: CPT

## 2023-06-15 LAB
ERYTHROCYTE [DISTWIDTH] IN BLOOD BY AUTOMATED COUNT: 5.6 X 10*6/UL (ref 4.4–5.6)
ERYTHROCYTE [DISTWIDTH] IN BLOOD: 13.2 % (ref 11.5–14.5)
HCT VFR BLD AUTO: 48.5 % (ref 39.6–50)
HGB BLD-MCNC: 15 D/DL (ref 12–15)
MCH RBC QN AUTO: 26.8 PG (ref 27–32)
MCHC RBC AUTO-ENTMCNC: 30.9 D/DL (ref 32–37)
MCV RBC AUTO: 86.6 FL (ref 80–97)
NRBC BLD AUTO-RTO: 0 X 10*3/UL (ref 0–0.01)
PLATELET # BLD AUTO: 234 X 10*3/UL (ref 140–440)
WBC # BLD AUTO: 9.83 X 10*3/UL (ref 4.5–10)

## 2023-06-18 ENCOUNTER — HOSPITAL ENCOUNTER (OUTPATIENT)
Dept: HOSPITAL 47 - 6NMEDSUR | Age: 73
Setting detail: OBSERVATION
LOS: 1 days | Discharge: HOME | End: 2023-06-19
Attending: INTERNAL MEDICINE | Admitting: INTERNAL MEDICINE
Payer: MEDICARE

## 2023-06-18 DIAGNOSIS — Z79.02: ICD-10-CM

## 2023-06-18 DIAGNOSIS — F17.210: ICD-10-CM

## 2023-06-18 DIAGNOSIS — N18.9: ICD-10-CM

## 2023-06-18 DIAGNOSIS — Z79.899: ICD-10-CM

## 2023-06-18 DIAGNOSIS — Z79.84: ICD-10-CM

## 2023-06-18 DIAGNOSIS — Z95.5: ICD-10-CM

## 2023-06-18 DIAGNOSIS — E78.00: ICD-10-CM

## 2023-06-18 DIAGNOSIS — E11.22: ICD-10-CM

## 2023-06-18 DIAGNOSIS — Z79.85: ICD-10-CM

## 2023-06-18 DIAGNOSIS — I12.9: ICD-10-CM

## 2023-06-18 DIAGNOSIS — I25.119: Primary | ICD-10-CM

## 2023-06-18 DIAGNOSIS — Z79.82: ICD-10-CM

## 2023-06-18 DIAGNOSIS — I25.2: ICD-10-CM

## 2023-06-18 DIAGNOSIS — G47.33: ICD-10-CM

## 2023-06-18 LAB
ANION GAP SERPL CALC-SCNC: 9 MMOL/L
BASOPHILS # BLD AUTO: 0.1 K/UL (ref 0–0.2)
BASOPHILS NFR BLD AUTO: 1 %
BUN SERPL-SCNC: 39 MG/DL (ref 9–20)
CALCIUM SPEC-MCNC: 9.7 MG/DL (ref 8.4–10.2)
CHLORIDE SERPL-SCNC: 101 MMOL/L (ref 98–107)
CO2 SERPL-SCNC: 28 MMOL/L (ref 22–30)
EOSINOPHIL # BLD AUTO: 0.3 K/UL (ref 0–0.7)
EOSINOPHIL NFR BLD AUTO: 3 %
ERYTHROCYTE [DISTWIDTH] IN BLOOD BY AUTOMATED COUNT: 5.5 M/UL (ref 4.3–5.9)
ERYTHROCYTE [DISTWIDTH] IN BLOOD: 13.2 % (ref 11.5–15.5)
GLUCOSE SERPL-MCNC: 204 MG/DL (ref 74–99)
HCT VFR BLD AUTO: 48.2 % (ref 39–53)
HGB BLD-MCNC: 15.7 GM/DL (ref 13–17.5)
LYMPHOCYTES # SPEC AUTO: 2 K/UL (ref 1–4.8)
LYMPHOCYTES NFR SPEC AUTO: 22 %
MCH RBC QN AUTO: 28.5 PG (ref 25–35)
MCHC RBC AUTO-ENTMCNC: 32.5 G/DL (ref 31–37)
MCV RBC AUTO: 87.5 FL (ref 80–100)
MONOCYTES # BLD AUTO: 1.1 K/UL (ref 0–1)
MONOCYTES NFR BLD AUTO: 12 %
NEUTROPHILS # BLD AUTO: 5.5 K/UL (ref 1.3–7.7)
NEUTROPHILS NFR BLD AUTO: 61 %
PLATELET # BLD AUTO: 196 K/UL (ref 150–450)
POTASSIUM SERPL-SCNC: 4.5 MMOL/L (ref 3.5–5.1)
SODIUM SERPL-SCNC: 138 MMOL/L (ref 137–145)
WBC # BLD AUTO: 9.1 K/UL (ref 3.8–10.6)

## 2023-06-18 PROCEDURE — 93458 L HRT ARTERY/VENTRICLE ANGIO: CPT

## 2023-06-18 PROCEDURE — 80048 BASIC METABOLIC PNL TOTAL CA: CPT

## 2023-06-18 PROCEDURE — 85025 COMPLETE CBC W/AUTO DIFF WBC: CPT

## 2023-06-18 RX ADMIN — CEFAZOLIN SCH MLS/HR: 330 INJECTION, POWDER, FOR SOLUTION INTRAMUSCULAR; INTRAVENOUS at 20:09

## 2023-06-19 VITALS — HEART RATE: 53 BPM

## 2023-06-19 VITALS — SYSTOLIC BLOOD PRESSURE: 149 MMHG | DIASTOLIC BLOOD PRESSURE: 82 MMHG | RESPIRATION RATE: 16 BRPM | TEMPERATURE: 97.5 F

## 2023-06-19 RX ADMIN — CEFAZOLIN SCH MLS/HR: 330 INJECTION, POWDER, FOR SOLUTION INTRAMUSCULAR; INTRAVENOUS at 09:56

## 2023-06-21 ENCOUNTER — HOSPITAL ENCOUNTER (OUTPATIENT)
Dept: HOSPITAL 47 - LABPAT | Age: 73
Discharge: HOME | End: 2023-06-21
Attending: INTERNAL MEDICINE
Payer: MEDICARE

## 2023-06-21 DIAGNOSIS — I25.2: ICD-10-CM

## 2023-06-21 DIAGNOSIS — Z01.812: Primary | ICD-10-CM

## 2023-06-21 LAB
ANION GAP SERPL CALC-SCNC: 10 MMOL/L
BUN SERPL-SCNC: 33 MG/DL (ref 9–20)
CHLORIDE SERPL-SCNC: 105 MMOL/L (ref 98–107)
CO2 SERPL-SCNC: 24 MMOL/L (ref 22–30)
ERYTHROCYTE [DISTWIDTH] IN BLOOD BY AUTOMATED COUNT: 5.65 M/UL (ref 4.3–5.9)
ERYTHROCYTE [DISTWIDTH] IN BLOOD: 13.1 % (ref 11.5–15.5)
HCT VFR BLD AUTO: 49.4 % (ref 39–53)
HGB BLD-MCNC: 15.9 GM/DL (ref 13–17.5)
MCH RBC QN AUTO: 28.2 PG (ref 25–35)
MCHC RBC AUTO-ENTMCNC: 32.2 G/DL (ref 31–37)
MCV RBC AUTO: 87.4 FL (ref 80–100)
PLATELET # BLD AUTO: 210 K/UL (ref 150–450)
POTASSIUM SERPL-SCNC: 5 MMOL/L (ref 3.5–5.1)
SODIUM SERPL-SCNC: 139 MMOL/L (ref 137–145)
WBC # BLD AUTO: 8.6 K/UL (ref 3.8–10.6)

## 2023-06-21 PROCEDURE — 85027 COMPLETE CBC AUTOMATED: CPT

## 2023-06-21 PROCEDURE — 36415 COLL VENOUS BLD VENIPUNCTURE: CPT

## 2023-06-21 PROCEDURE — 84520 ASSAY OF UREA NITROGEN: CPT

## 2023-06-21 PROCEDURE — 82565 ASSAY OF CREATININE: CPT

## 2023-06-21 PROCEDURE — 80051 ELECTROLYTE PANEL: CPT

## 2023-06-27 ENCOUNTER — HOSPITAL ENCOUNTER (OUTPATIENT)
Dept: HOSPITAL 47 - CATHCVL | Age: 73
Discharge: HOME | End: 2023-06-27
Attending: INTERNAL MEDICINE
Payer: MEDICARE

## 2023-06-27 VITALS — HEART RATE: 49 BPM | SYSTOLIC BLOOD PRESSURE: 161 MMHG | DIASTOLIC BLOOD PRESSURE: 76 MMHG

## 2023-06-27 VITALS — TEMPERATURE: 97.1 F

## 2023-06-27 VITALS — BODY MASS INDEX: 27.6 KG/M2

## 2023-06-27 VITALS — RESPIRATION RATE: 18 BRPM

## 2023-06-27 DIAGNOSIS — I25.10: Primary | ICD-10-CM

## 2023-06-27 DIAGNOSIS — I12.9: ICD-10-CM

## 2023-06-27 DIAGNOSIS — I25.2: ICD-10-CM

## 2023-06-27 DIAGNOSIS — Z87.19: ICD-10-CM

## 2023-06-27 DIAGNOSIS — Z79.899: ICD-10-CM

## 2023-06-27 DIAGNOSIS — Z82.49: ICD-10-CM

## 2023-06-27 DIAGNOSIS — Z79.82: ICD-10-CM

## 2023-06-27 DIAGNOSIS — Z95.5: ICD-10-CM

## 2023-06-27 DIAGNOSIS — Z79.02: ICD-10-CM

## 2023-06-27 DIAGNOSIS — E11.22: ICD-10-CM

## 2023-06-27 DIAGNOSIS — Z79.51: ICD-10-CM

## 2023-06-27 DIAGNOSIS — I08.2: ICD-10-CM

## 2023-06-27 DIAGNOSIS — G47.33: ICD-10-CM

## 2023-06-27 DIAGNOSIS — Z87.891: ICD-10-CM

## 2023-06-27 DIAGNOSIS — Z86.73: ICD-10-CM

## 2023-06-27 DIAGNOSIS — E11.41: ICD-10-CM

## 2023-06-27 DIAGNOSIS — Z79.84: ICD-10-CM

## 2023-06-27 DIAGNOSIS — Z99.89: ICD-10-CM

## 2023-06-27 DIAGNOSIS — N18.9: ICD-10-CM

## 2023-06-27 LAB
ALBUMIN SERPL-MCNC: 3.8 G/DL (ref 3.5–5)
ALP SERPL-CCNC: 55 U/L (ref 38–126)
ALT SERPL-CCNC: 26 U/L (ref 4–49)
ANION GAP SERPL CALC-SCNC: 8 MMOL/L
APTT BLD: 41.3 SEC (ref 22–30)
AST SERPL-CCNC: 24 U/L (ref 17–59)
BASOPHILS # BLD AUTO: 0 K/UL (ref 0–0.2)
BASOPHILS NFR BLD AUTO: 0 %
BUN SERPL-SCNC: 33 MG/DL (ref 9–20)
CALCIUM SPEC-MCNC: 9 MG/DL (ref 8.4–10.2)
CHLORIDE SERPL-SCNC: 103 MMOL/L (ref 98–107)
CHOLEST SERPL-MCNC: 86 MG/DL (ref 0–200)
CO2 SERPL-SCNC: 26 MMOL/L (ref 22–30)
EOSINOPHIL # BLD AUTO: 0.3 K/UL (ref 0–0.7)
EOSINOPHIL NFR BLD AUTO: 3 %
ERYTHROCYTE [DISTWIDTH] IN BLOOD BY AUTOMATED COUNT: 5.55 M/UL (ref 4.3–5.9)
ERYTHROCYTE [DISTWIDTH] IN BLOOD: 13.4 % (ref 11.5–15.5)
GLUCOSE BLD-MCNC: 130 MG/DL (ref 70–110)
GLUCOSE SERPL-MCNC: 239 MG/DL (ref 74–99)
GLUCOSE UR QL: (no result)
HCT VFR BLD AUTO: 46.8 % (ref 39–53)
HDLC SERPL-MCNC: 36.6 MG/DL (ref 40–60)
HGB BLD-MCNC: 15.5 GM/DL (ref 13–17.5)
INR PPP: 1.1 (ref ?–1.2)
LDLC SERPL CALC-MCNC: 20.6 MG/DL (ref 0–131)
LYMPHOCYTES # SPEC AUTO: 2 K/UL (ref 1–4.8)
LYMPHOCYTES NFR SPEC AUTO: 22 %
MAGNESIUM SPEC-SCNC: 2.4 MG/DL (ref 1.6–2.3)
MCH RBC QN AUTO: 27.9 PG (ref 25–35)
MCHC RBC AUTO-ENTMCNC: 33 G/DL (ref 31–37)
MCV RBC AUTO: 84.3 FL (ref 80–100)
MONOCYTES # BLD AUTO: 1 K/UL (ref 0–1)
MONOCYTES NFR BLD AUTO: 11 %
NEUTROPHILS # BLD AUTO: 5.7 K/UL (ref 1.3–7.7)
NEUTROPHILS NFR BLD AUTO: 62 %
PH UR: 5.5 [PH] (ref 5–8)
PLATELET # BLD AUTO: 193 K/UL (ref 150–450)
POTASSIUM SERPL-SCNC: 4.5 MMOL/L (ref 3.5–5.1)
PROT SERPL-MCNC: 6.8 G/DL (ref 6.3–8.2)
PROT UR QL: (no result)
PT BLD: 11.2 SEC (ref 9–12)
RBC UR QL: <1 /HPF (ref 0–5)
SODIUM SERPL-SCNC: 137 MMOL/L (ref 137–145)
SP GR UR: 1.02 (ref 1–1.03)
TRIGL SERPL-MCNC: 144 MG/DL (ref 0–149)
UROBILINOGEN UR QL STRIP: <2 MG/DL (ref ?–2)
VLDLC SERPL CALC-MCNC: 28.8 MG/DL (ref 5–40)
WBC # BLD AUTO: 9.2 K/UL (ref 3.8–10.6)
WBC #/AREA URNS HPF: <1 /HPF (ref 0–5)

## 2023-06-27 PROCEDURE — 83036 HEMOGLOBIN GLYCOSYLATED A1C: CPT

## 2023-06-27 PROCEDURE — 87070 CULTURE OTHR SPECIMN AEROBIC: CPT

## 2023-06-27 PROCEDURE — 80074 ACUTE HEPATITIS PANEL: CPT

## 2023-06-27 PROCEDURE — 84443 ASSAY THYROID STIM HORMONE: CPT

## 2023-06-27 PROCEDURE — 99153 MOD SED SAME PHYS/QHP EA: CPT

## 2023-06-27 PROCEDURE — 85730 THROMBOPLASTIN TIME PARTIAL: CPT

## 2023-06-27 PROCEDURE — 93454 CORONARY ARTERY ANGIO S&I: CPT

## 2023-06-27 PROCEDURE — 85025 COMPLETE CBC W/AUTO DIFF WBC: CPT

## 2023-06-27 PROCEDURE — 71250 CT THORAX DX C-: CPT

## 2023-06-27 PROCEDURE — 93922 UPR/L XTREMITY ART 2 LEVELS: CPT

## 2023-06-27 PROCEDURE — 85610 PROTHROMBIN TIME: CPT

## 2023-06-27 PROCEDURE — 93970 EXTREMITY STUDY: CPT

## 2023-06-27 PROCEDURE — 80061 LIPID PANEL: CPT

## 2023-06-27 PROCEDURE — 93931 UPPER EXTREMITY STUDY: CPT

## 2023-06-27 PROCEDURE — 71046 X-RAY EXAM CHEST 2 VIEWS: CPT

## 2023-06-27 PROCEDURE — 80053 COMPREHEN METABOLIC PANEL: CPT

## 2023-06-27 PROCEDURE — 93306 TTE W/DOPPLER COMPLETE: CPT

## 2023-06-27 PROCEDURE — 99152 MOD SED SAME PHYS/QHP 5/>YRS: CPT

## 2023-06-27 PROCEDURE — 83735 ASSAY OF MAGNESIUM: CPT

## 2023-06-27 PROCEDURE — 81001 URINALYSIS AUTO W/SCOPE: CPT

## 2023-06-27 PROCEDURE — 93799 UNLISTED CV SVC/PROCEDURE: CPT

## 2023-06-27 PROCEDURE — 94150 VITAL CAPACITY TEST: CPT

## 2023-06-27 PROCEDURE — 93880 EXTRACRANIAL BILAT STUDY: CPT

## 2023-07-13 ENCOUNTER — HOSPITAL ENCOUNTER (OUTPATIENT)
Dept: HOSPITAL 47 - LABPAT | Age: 73
Discharge: HOME | End: 2023-07-13
Attending: THORACIC SURGERY (CARDIOTHORACIC VASCULAR SURGERY)
Payer: MEDICARE

## 2023-07-13 DIAGNOSIS — I25.10: Primary | ICD-10-CM

## 2023-07-13 LAB
ALBUMIN SERPL-MCNC: 4.4 D/DL (ref 3.8–4.9)
ALBUMIN/GLOB SERPL: 1.42 RATIO (ref 1.6–3.17)
ALP SERPL-CCNC: 65 U/L (ref 41–126)
ALT SERPL-CCNC: 32 U/L (ref 10–49)
ANION GAP SERPL CALC-SCNC: 12.8 MMOL/L (ref 4–12)
APTT BLD: 24.9 SEC (ref 22–30)
AST SERPL-CCNC: 26 U/L (ref 14–35)
BUN SERPL-SCNC: 34.1 MG/DL (ref 9–27)
BUN/CREAT SERPL: 13.12 RATIO (ref 12–20)
CALCIUM SPEC-MCNC: 10.5 MG/DL (ref 8.7–10.3)
CHLORIDE SERPL-SCNC: 98 MMOL/L (ref 96–109)
CO2 SERPL-SCNC: 28.2 MMOL/L (ref 21.6–31.8)
ERYTHROCYTE [DISTWIDTH] IN BLOOD BY AUTOMATED COUNT: 5.89 X 10*6/UL (ref 4.4–5.6)
ERYTHROCYTE [DISTWIDTH] IN BLOOD: 13.2 % (ref 11.5–14.5)
GLOBULIN SER CALC-MCNC: 3.1 D/DL (ref 1.6–3.3)
GLUCOSE SERPL-MCNC: 263 MG/DL (ref 70–110)
HCT VFR BLD AUTO: 50.1 % (ref 39.6–50)
HGB BLD-MCNC: 16.5 D/DL (ref 12–15)
INR PPP: 1 (ref ?–1.2)
MCH RBC QN AUTO: 28 PG (ref 27–32)
MCHC RBC AUTO-ENTMCNC: 32.9 D/DL (ref 32–37)
MCV RBC AUTO: 85.1 FL (ref 80–97)
NRBC BLD AUTO-RTO: 0 X 10*3/UL (ref 0–0.01)
PLATELET # BLD AUTO: 218 X 10*3/UL (ref 140–440)
POTASSIUM SERPL-SCNC: 5.2 MMOL/L (ref 3.5–5.5)
PROT SERPL-MCNC: 7.5 D/DL (ref 6.2–8.2)
PT BLD: 10.7 SEC (ref 9–12)
SODIUM SERPL-SCNC: 139 MMOL/L (ref 135–145)
WBC # BLD AUTO: 8.91 X 10*3/UL (ref 4.5–10)

## 2023-07-13 PROCEDURE — 85730 THROMBOPLASTIN TIME PARTIAL: CPT

## 2023-07-13 PROCEDURE — 85027 COMPLETE CBC AUTOMATED: CPT

## 2023-07-13 PROCEDURE — 80053 COMPREHEN METABOLIC PANEL: CPT

## 2023-07-13 PROCEDURE — 85610 PROTHROMBIN TIME: CPT

## 2023-08-08 ENCOUNTER — HOSPITAL ENCOUNTER (OUTPATIENT)
Dept: HOSPITAL 47 - RADXRMAIN | Age: 73
Discharge: HOME | End: 2023-08-08
Attending: INTERNAL MEDICINE
Payer: MEDICARE

## 2023-08-08 DIAGNOSIS — J91.8: Primary | ICD-10-CM

## 2023-08-08 PROCEDURE — 71046 X-RAY EXAM CHEST 2 VIEWS: CPT

## 2023-10-10 ENCOUNTER — HOSPITAL ENCOUNTER (OUTPATIENT)
Dept: HOSPITAL 47 - LABWHC1 | Age: 73
Discharge: HOME | End: 2023-10-10
Attending: INTERNAL MEDICINE
Payer: MEDICARE

## 2023-10-10 DIAGNOSIS — R80.9: ICD-10-CM

## 2023-10-10 DIAGNOSIS — N39.0: ICD-10-CM

## 2023-10-10 DIAGNOSIS — N25.81: ICD-10-CM

## 2023-10-10 DIAGNOSIS — E55.9: Primary | ICD-10-CM

## 2023-10-10 DIAGNOSIS — N02.2: ICD-10-CM

## 2023-10-10 LAB
ANION GAP SERPL CALC-SCNC: 11.6 MMOL/L (ref 4–12)
BUN SERPL-SCNC: 27.9 MG/DL (ref 9–27)
BUN/CREAT SERPL: 16.41 RATIO (ref 12–20)
CALCIUM SPEC-MCNC: 10.1 MG/DL (ref 8.7–10.3)
CHLORIDE SERPL-SCNC: 103 MMOL/L (ref 96–109)
CO2 SERPL-SCNC: 26.4 MMOL/L (ref 21.6–31.8)
GLUCOSE SERPL-MCNC: 210 MG/DL (ref 70–110)
MAGNESIUM SPEC-SCNC: 2.1 MG/DL (ref 1.5–2.4)
PH UR: 6 [PH]
POTASSIUM SERPL-SCNC: 4.3 MMOL/L (ref 3.5–5.5)
SODIUM SERPL-SCNC: 141 MMOL/L (ref 135–145)
SP GR UR: 1.02 (ref 1–1.03)
UROBILINOGEN UR QL: 0.2 E.U./DL

## 2023-10-10 PROCEDURE — 83735 ASSAY OF MAGNESIUM: CPT

## 2023-10-10 PROCEDURE — 82306 VITAMIN D 25 HYDROXY: CPT

## 2023-10-10 PROCEDURE — 81001 URINALYSIS AUTO W/SCOPE: CPT

## 2023-10-10 PROCEDURE — 84156 ASSAY OF PROTEIN URINE: CPT

## 2023-10-10 PROCEDURE — 83970 ASSAY OF PARATHORMONE: CPT

## 2023-10-10 PROCEDURE — 36415 COLL VENOUS BLD VENIPUNCTURE: CPT

## 2023-10-10 PROCEDURE — 80048 BASIC METABOLIC PNL TOTAL CA: CPT

## 2023-10-10 PROCEDURE — 80197 ASSAY OF TACROLIMUS: CPT

## 2023-10-10 PROCEDURE — 82570 ASSAY OF URINE CREATININE: CPT

## 2023-10-10 PROCEDURE — 84100 ASSAY OF PHOSPHORUS: CPT

## 2023-10-11 LAB — PROT/CREAT UR-RTO: 2.24 RATIO

## 2024-01-04 ENCOUNTER — HOSPITAL ENCOUNTER (OUTPATIENT)
Dept: HOSPITAL 47 - SLEEP | Age: 74
End: 2024-01-04
Payer: MEDICARE

## 2024-01-04 DIAGNOSIS — G47.33: Primary | ICD-10-CM

## 2024-01-04 DIAGNOSIS — Z79.84: ICD-10-CM

## 2024-01-04 DIAGNOSIS — Z86.73: ICD-10-CM

## 2024-01-04 DIAGNOSIS — I25.10: ICD-10-CM

## 2024-01-04 DIAGNOSIS — J45.909: ICD-10-CM

## 2024-01-04 DIAGNOSIS — Z95.1: ICD-10-CM

## 2024-01-04 DIAGNOSIS — Z99.89: ICD-10-CM

## 2024-01-04 DIAGNOSIS — Z79.82: ICD-10-CM

## 2024-01-04 DIAGNOSIS — Z87.891: ICD-10-CM

## 2024-01-04 DIAGNOSIS — I10: ICD-10-CM

## 2024-01-04 DIAGNOSIS — H40.051: ICD-10-CM

## 2024-01-04 DIAGNOSIS — Z79.899: ICD-10-CM

## 2024-01-04 DIAGNOSIS — E78.5: ICD-10-CM

## 2024-01-04 DIAGNOSIS — E11.9: ICD-10-CM

## 2024-01-04 PROCEDURE — 99212 OFFICE O/P EST SF 10 MIN: CPT

## 2024-05-17 ENCOUNTER — HOSPITAL ENCOUNTER (OUTPATIENT)
Dept: HOSPITAL 47 - LABWHC1 | Age: 74
Discharge: HOME | End: 2024-05-17
Attending: INTERNAL MEDICINE
Payer: MEDICARE

## 2024-05-17 DIAGNOSIS — I10: ICD-10-CM

## 2024-05-17 DIAGNOSIS — E78.00: ICD-10-CM

## 2024-05-17 DIAGNOSIS — E21.3: ICD-10-CM

## 2024-05-17 DIAGNOSIS — E55.9: ICD-10-CM

## 2024-05-17 DIAGNOSIS — E11.9: Primary | ICD-10-CM

## 2024-05-17 LAB
ALBUMIN SERPL-MCNC: 4.4 G/DL (ref 3.8–4.9)
ALBUMIN/GLOB SERPL: 1.69 RATIO (ref 1.6–3.17)
ALP SERPL-CCNC: 57 U/L (ref 41–126)
ALT SERPL-CCNC: 45 U/L (ref 10–49)
ANION GAP SERPL CALC-SCNC: 13.6 MMOL/L (ref 4–12)
AST SERPL-CCNC: 29 U/L (ref 14–35)
BASOPHILS # BLD AUTO: 0.07 X 10*3/UL (ref 0–0.1)
BASOPHILS NFR BLD AUTO: 0.9 %
BUN SERPL-SCNC: 35.8 MG/DL (ref 9–27)
BUN/CREAT SERPL: 19.89 RATIO (ref 12–20)
CALCIUM SPEC-MCNC: 10.6 MG/DL (ref 8.7–10.3)
CHLORIDE SERPL-SCNC: 103 MMOL/L (ref 96–109)
CHOLEST SERPL-MCNC: 93 MG/DL (ref 0–200)
CO2 SERPL-SCNC: 23.4 MMOL/L (ref 21.6–31.8)
EOSINOPHIL # BLD AUTO: 0.42 X 10*3/UL (ref 0.04–0.35)
EOSINOPHIL NFR BLD AUTO: 5.3 %
ERYTHROCYTE [DISTWIDTH] IN BLOOD BY AUTOMATED COUNT: 5.89 X 10*6/UL (ref 4.4–5.6)
ERYTHROCYTE [DISTWIDTH] IN BLOOD: 13.3 % (ref 11.5–14.5)
GLOBULIN SER CALC-MCNC: 2.6 G/DL (ref 1.6–3.3)
GLUCOSE SERPL-MCNC: 144 MG/DL (ref 70–110)
HCT VFR BLD AUTO: 52.6 % (ref 39.6–50)
HDLC SERPL-MCNC: 40.2 MG/DL (ref 40–60)
HGB BLD-MCNC: 17.2 G/DL (ref 13–17)
IMM GRANULOCYTES BLD QL AUTO: 0.3 %
LDLC SERPL CALC-MCNC: 33.4 MG/DL (ref 0–131)
LYMPHOCYTES # SPEC AUTO: 1.98 X 10*3/UL (ref 0.9–5)
LYMPHOCYTES NFR SPEC AUTO: 25 %
MCH RBC QN AUTO: 29.2 PG (ref 27–32)
MCHC RBC AUTO-ENTMCNC: 32.7 G/DL (ref 32–37)
MCV RBC AUTO: 89.3 FL (ref 80–97)
MONOCYTES # BLD AUTO: 1.11 X 10*3/UL (ref 0.2–1)
MONOCYTES NFR BLD AUTO: 14 %
NEUTROPHILS # BLD AUTO: 4.32 X 10*3/UL (ref 1.8–7.7)
NEUTROPHILS NFR BLD AUTO: 54.5 %
NRBC BLD AUTO-RTO: 0 X 10*3/UL (ref 0–0.01)
PLATELET # BLD AUTO: 168 X 10*3/UL (ref 140–440)
POTASSIUM SERPL-SCNC: 4.9 MMOL/L (ref 3.5–5.5)
PROT SERPL-MCNC: 7 G/DL (ref 6.2–8.2)
SODIUM SERPL-SCNC: 140 MMOL/L (ref 135–145)
TRIGL SERPL-MCNC: 97 MG/DL (ref 0–149)
VLDLC SERPL CALC-MCNC: 19.4 MG/DL (ref 5–40)
WBC # BLD AUTO: 7.92 X 10*3/UL (ref 4.5–10)

## 2024-05-17 PROCEDURE — 80061 LIPID PANEL: CPT

## 2024-05-17 PROCEDURE — 82043 UR ALBUMIN QUANTITATIVE: CPT

## 2024-05-17 PROCEDURE — 82570 ASSAY OF URINE CREATININE: CPT

## 2024-05-17 PROCEDURE — 80053 COMPREHEN METABOLIC PANEL: CPT

## 2024-05-17 PROCEDURE — 83036 HEMOGLOBIN GLYCOSYLATED A1C: CPT

## 2024-05-17 PROCEDURE — 82306 VITAMIN D 25 HYDROXY: CPT

## 2024-05-17 PROCEDURE — 36415 COLL VENOUS BLD VENIPUNCTURE: CPT

## 2024-05-17 PROCEDURE — 84443 ASSAY THYROID STIM HORMONE: CPT

## 2024-05-17 PROCEDURE — 85025 COMPLETE CBC W/AUTO DIFF WBC: CPT

## 2024-05-24 ENCOUNTER — HOSPITAL ENCOUNTER (OUTPATIENT)
Dept: HOSPITAL 47 - LABWHC1 | Age: 74
Discharge: HOME | End: 2024-05-24
Attending: INTERNAL MEDICINE
Payer: MEDICARE

## 2024-05-24 DIAGNOSIS — E21.3: ICD-10-CM

## 2024-05-24 DIAGNOSIS — N18.4: Primary | ICD-10-CM

## 2024-05-24 LAB
BASOPHILS # BLD AUTO: 0.07 X 10*3/UL (ref 0–0.1)
BASOPHILS NFR BLD AUTO: 0.9 %
EOSINOPHIL # BLD AUTO: 0.31 X 10*3/UL (ref 0.04–0.35)
EOSINOPHIL NFR BLD AUTO: 4 %
ERYTHROCYTE [DISTWIDTH] IN BLOOD BY AUTOMATED COUNT: 5.77 X 10*6/UL (ref 4.4–5.6)
ERYTHROCYTE [DISTWIDTH] IN BLOOD: 13.6 % (ref 11.5–14.5)
HCT VFR BLD AUTO: 50.9 % (ref 39.6–50)
HGB BLD-MCNC: 16.5 G/DL (ref 13–17)
IMM GRANULOCYTES BLD QL AUTO: 0.1 %
LYMPHOCYTES # SPEC AUTO: 1.84 X 10*3/UL (ref 0.9–5)
LYMPHOCYTES NFR SPEC AUTO: 23.7 %
MCH RBC QN AUTO: 28.6 PG (ref 27–32)
MCHC RBC AUTO-ENTMCNC: 32.4 G/DL (ref 32–37)
MCV RBC AUTO: 88.2 FL (ref 80–97)
MONOCYTES # BLD AUTO: 1.03 X 10*3/UL (ref 0.2–1)
MONOCYTES NFR BLD AUTO: 13.3 %
NEUTROPHILS # BLD AUTO: 4.49 X 10*3/UL (ref 1.8–7.7)
NEUTROPHILS NFR BLD AUTO: 58 %
NRBC BLD AUTO-RTO: 0 X 10*3/UL (ref 0–0.01)
PLATELET # BLD AUTO: 174 X 10*3/UL (ref 140–440)
WBC # BLD AUTO: 7.75 X 10*3/UL (ref 4.5–10)

## 2024-05-24 PROCEDURE — 36415 COLL VENOUS BLD VENIPUNCTURE: CPT

## 2024-05-24 PROCEDURE — 85025 COMPLETE CBC W/AUTO DIFF WBC: CPT

## 2024-05-24 PROCEDURE — 83970 ASSAY OF PARATHORMONE: CPT

## 2024-05-24 PROCEDURE — 82310 ASSAY OF CALCIUM: CPT

## 2024-08-22 ENCOUNTER — HOSPITAL ENCOUNTER (OUTPATIENT)
Dept: HOSPITAL 47 - 3 N SLEEP | Age: 74
End: 2024-08-22
Payer: MEDICARE

## 2024-08-22 PROCEDURE — 99212 OFFICE O/P EST SF 10 MIN: CPT

## 2024-09-11 ENCOUNTER — HOSPITAL ENCOUNTER (OUTPATIENT)
Dept: HOSPITAL 47 - LABWHC1 | Age: 74
Discharge: HOME | End: 2024-09-11
Attending: INTERNAL MEDICINE
Payer: MEDICARE

## 2024-09-11 DIAGNOSIS — I10: ICD-10-CM

## 2024-09-11 DIAGNOSIS — E11.9: Primary | ICD-10-CM

## 2024-09-11 DIAGNOSIS — R53.83: ICD-10-CM

## 2024-09-11 DIAGNOSIS — E78.00: ICD-10-CM

## 2024-09-11 DIAGNOSIS — E55.9: ICD-10-CM

## 2024-09-11 LAB
ALBUMIN SERPL-MCNC: 4.3 G/DL (ref 3.8–4.9)
ALBUMIN/GLOB SERPL: 1.59 RATIO (ref 1.6–3.17)
ALP SERPL-CCNC: 50 U/L (ref 41–126)
ALT SERPL-CCNC: 52 U/L (ref 10–49)
ANION GAP SERPL CALC-SCNC: 11 MMOL/L (ref 4–12)
AST SERPL-CCNC: 35 U/L (ref 14–35)
BASOPHILS # BLD AUTO: 0.05 X 10*3/UL (ref 0–0.1)
BASOPHILS NFR BLD AUTO: 0.6 %
BUN SERPL-SCNC: 41.2 MG/DL (ref 9–27)
BUN/CREAT SERPL: 24.24 RATIO (ref 12–20)
CALCIUM SPEC-MCNC: 10.3 MG/DL (ref 8.7–10.3)
CHLORIDE SERPL-SCNC: 103 MMOL/L (ref 96–109)
CHOLEST SERPL-MCNC: 92 MG/DL (ref 0–200)
CO2 SERPL-SCNC: 24 MMOL/L (ref 21.6–31.8)
EOSINOPHIL # BLD AUTO: 0.25 X 10*3/UL (ref 0.04–0.35)
EOSINOPHIL NFR BLD AUTO: 3 %
ERYTHROCYTE [DISTWIDTH] IN BLOOD BY AUTOMATED COUNT: 5.82 X 10*6/UL (ref 4.4–5.6)
ERYTHROCYTE [DISTWIDTH] IN BLOOD: 12.6 % (ref 11.5–14.5)
GLOBULIN SER CALC-MCNC: 2.7 G/DL (ref 1.6–3.3)
GLUCOSE SERPL-MCNC: 162 MG/DL (ref 70–110)
HCT VFR BLD AUTO: 52.4 % (ref 39.6–50)
HDLC SERPL-MCNC: 35.7 MG/DL (ref 40–60)
HGB BLD-MCNC: 17.1 G/DL (ref 13–17)
IMM GRANULOCYTES BLD QL AUTO: 0.4 %
LDLC SERPL CALC-MCNC: 28.5 MG/DL (ref 0–131)
LYMPHOCYTES # SPEC AUTO: 2.07 X 10*3/UL (ref 0.9–5)
LYMPHOCYTES NFR SPEC AUTO: 25.1 %
MCH RBC QN AUTO: 29.4 PG (ref 27–32)
MCHC RBC AUTO-ENTMCNC: 32.6 G/DL (ref 32–37)
MCV RBC AUTO: 90 FL (ref 80–97)
MONOCYTES # BLD AUTO: 1.03 X 10*3/UL (ref 0.2–1)
MONOCYTES NFR BLD AUTO: 12.5 %
NEUTROPHILS # BLD AUTO: 4.81 X 10*3/UL (ref 1.8–7.7)
NEUTROPHILS NFR BLD AUTO: 58.4 %
NRBC BLD AUTO-RTO: 0 X 10*3/UL (ref 0–0.01)
PLATELET # BLD AUTO: 178 X 10*3/UL (ref 140–440)
POTASSIUM SERPL-SCNC: 4.9 MMOL/L (ref 3.5–5.5)
PROT SERPL-MCNC: 7 G/DL (ref 6.2–8.2)
SODIUM SERPL-SCNC: 138 MMOL/L (ref 135–145)
TRIGL SERPL-MCNC: 139 MG/DL (ref 0–149)
VLDLC SERPL CALC-MCNC: 27.8 MG/DL (ref 5–40)
WBC # BLD AUTO: 8.24 X 10*3/UL (ref 4.5–10)

## 2024-09-11 PROCEDURE — 80061 LIPID PANEL: CPT

## 2024-09-11 PROCEDURE — 82043 UR ALBUMIN QUANTITATIVE: CPT

## 2024-09-11 PROCEDURE — 82570 ASSAY OF URINE CREATININE: CPT

## 2024-09-11 PROCEDURE — 85025 COMPLETE CBC W/AUTO DIFF WBC: CPT

## 2024-09-11 PROCEDURE — 82306 VITAMIN D 25 HYDROXY: CPT

## 2024-09-11 PROCEDURE — 80053 COMPREHEN METABOLIC PANEL: CPT

## 2024-09-11 PROCEDURE — 83036 HEMOGLOBIN GLYCOSYLATED A1C: CPT

## 2024-09-11 PROCEDURE — 84443 ASSAY THYROID STIM HORMONE: CPT

## 2024-09-11 PROCEDURE — 36415 COLL VENOUS BLD VENIPUNCTURE: CPT

## 2025-03-24 ENCOUNTER — HOSPITAL ENCOUNTER (EMERGENCY)
Dept: HOSPITAL 47 - EC | Age: 75
Discharge: HOME | End: 2025-03-24
Payer: MEDICARE

## 2025-03-24 VITALS — RESPIRATION RATE: 20 BRPM | HEART RATE: 84 BPM

## 2025-03-24 VITALS — DIASTOLIC BLOOD PRESSURE: 100 MMHG | SYSTOLIC BLOOD PRESSURE: 175 MMHG | TEMPERATURE: 97.5 F

## 2025-03-24 DIAGNOSIS — Z87.891: ICD-10-CM

## 2025-03-24 DIAGNOSIS — J40: Primary | ICD-10-CM

## 2025-03-24 LAB
ALBUMIN SERPL-MCNC: 4.2 G/DL (ref 3.5–5)
ALP SERPL-CCNC: 56 U/L (ref 38–126)
ALT SERPL-CCNC: 39 U/L (ref 4–49)
ANION GAP SERPL CALC-SCNC: 12 MMOL/L
APTT BLD: 23.6 SEC (ref 22–30)
AST SERPL-CCNC: 30 U/L (ref 17–59)
BASOPHILS # BLD AUTO: 0.1 K/UL (ref 0–0.2)
BASOPHILS NFR BLD AUTO: 1 %
BUN SERPL-SCNC: 43 MG/DL (ref 9–20)
CALCIUM SPEC-MCNC: 11.1 MG/DL (ref 8.4–10.2)
CHLORIDE SERPL-SCNC: 104 MMOL/L (ref 98–107)
CO2 SERPL-SCNC: 20 MMOL/L (ref 22–30)
EOSINOPHIL # BLD AUTO: 0.8 K/UL (ref 0–0.7)
EOSINOPHIL NFR BLD AUTO: 8 %
ERYTHROCYTE [DISTWIDTH] IN BLOOD BY AUTOMATED COUNT: 6.21 M/UL (ref 4.3–5.9)
ERYTHROCYTE [DISTWIDTH] IN BLOOD: 13.1 % (ref 11.5–15.5)
GLUCOSE SERPL-MCNC: 123 MG/DL (ref 74–99)
HCT VFR BLD AUTO: 55.3 % (ref 39–53)
HGB BLD-MCNC: 18.2 GM/DL (ref 13–17.5)
INR PPP: 1 (ref ?–1.2)
LYMPHOCYTES # SPEC AUTO: 1.9 K/UL (ref 1–4.8)
LYMPHOCYTES NFR SPEC AUTO: 17 %
MAGNESIUM SPEC-SCNC: 2.1 MG/DL (ref 1.6–2.3)
MCH RBC QN AUTO: 29.2 PG (ref 25–35)
MCHC RBC AUTO-ENTMCNC: 32.8 G/DL (ref 31–37)
MCV RBC AUTO: 89 FL (ref 80–100)
MONOCYTES # BLD AUTO: 1.1 K/UL (ref 0–1)
MONOCYTES NFR BLD AUTO: 10 %
NEUTROPHILS # BLD AUTO: 6.8 K/UL (ref 1.3–7.7)
NEUTROPHILS NFR BLD AUTO: 63 %
NT-PROBNP SERPL-MCNC: 175 PG/ML
PLATELET # BLD AUTO: 188 K/UL (ref 150–450)
POTASSIUM SERPL-SCNC: 4.9 MMOL/L (ref 3.5–5.1)
PROT SERPL-MCNC: 7.3 G/DL (ref 6.3–8.2)
PT BLD: 11 SEC (ref 10–12.5)
SODIUM SERPL-SCNC: 136 MMOL/L (ref 137–145)
WBC # BLD AUTO: 10.8 K/UL (ref 3.8–10.6)

## 2025-03-24 PROCEDURE — 96374 THER/PROPH/DIAG INJ IV PUSH: CPT

## 2025-03-24 PROCEDURE — 85730 THROMBOPLASTIN TIME PARTIAL: CPT

## 2025-03-24 PROCEDURE — 83605 ASSAY OF LACTIC ACID: CPT

## 2025-03-24 PROCEDURE — 36415 COLL VENOUS BLD VENIPUNCTURE: CPT

## 2025-03-24 PROCEDURE — 87636 SARSCOV2 & INF A&B AMP PRB: CPT

## 2025-03-24 PROCEDURE — 71046 X-RAY EXAM CHEST 2 VIEWS: CPT

## 2025-03-24 PROCEDURE — 83880 ASSAY OF NATRIURETIC PEPTIDE: CPT

## 2025-03-24 PROCEDURE — 85610 PROTHROMBIN TIME: CPT

## 2025-03-24 PROCEDURE — 99285 EMERGENCY DEPT VISIT HI MDM: CPT

## 2025-03-24 PROCEDURE — 85025 COMPLETE CBC W/AUTO DIFF WBC: CPT

## 2025-03-24 PROCEDURE — 93005 ELECTROCARDIOGRAM TRACING: CPT

## 2025-03-24 PROCEDURE — 80053 COMPREHEN METABOLIC PANEL: CPT

## 2025-03-24 PROCEDURE — 94640 AIRWAY INHALATION TREATMENT: CPT

## 2025-03-24 PROCEDURE — 83735 ASSAY OF MAGNESIUM: CPT

## 2025-03-24 PROCEDURE — 84484 ASSAY OF TROPONIN QUANT: CPT

## 2025-03-24 RX ADMIN — IPRATROPIUM BROMIDE AND ALBUTEROL SULFATE STA ML: .5; 3 SOLUTION RESPIRATORY (INHALATION) at 14:28

## 2025-03-24 RX ADMIN — NICARDIPINE HYDROCHLORIDE ONE MLS/HR: 2.5 INJECTION INTRAVENOUS at 13:11

## 2025-03-24 RX ADMIN — CEFTRIAXONE SODIUM STA MG: 1 INJECTION, POWDER, FOR SOLUTION INTRAMUSCULAR; INTRAVENOUS at 15:45

## 2025-03-24 RX ADMIN — AMOXICILLIN AND CLAVULANATE POTASSIUM STA EACH: 875; 125 TABLET, FILM COATED ORAL at 15:44

## 2025-05-05 ENCOUNTER — HOSPITAL ENCOUNTER (OUTPATIENT)
Dept: HOSPITAL 47 - LABWHC1 | Age: 75
Discharge: HOME | End: 2025-05-05
Attending: INTERNAL MEDICINE
Payer: MEDICARE

## 2025-05-05 DIAGNOSIS — Z12.5: Primary | ICD-10-CM

## 2025-05-05 DIAGNOSIS — N18.9: ICD-10-CM

## 2025-05-05 DIAGNOSIS — E78.00: ICD-10-CM

## 2025-05-05 DIAGNOSIS — E21.3: ICD-10-CM

## 2025-05-05 DIAGNOSIS — I13.0: ICD-10-CM

## 2025-05-05 DIAGNOSIS — I50.9: ICD-10-CM

## 2025-05-05 DIAGNOSIS — E55.9: ICD-10-CM

## 2025-05-05 DIAGNOSIS — E11.22: ICD-10-CM

## 2025-05-05 LAB
ALBUMIN SERPL-MCNC: 3.7 G/DL (ref 3.8–4.9)
ALBUMIN/GLOB SERPL: 1.42 RATIO (ref 1.6–3.17)
ALP SERPL-CCNC: 46 U/L (ref 41–126)
ALT SERPL-CCNC: 42 U/L (ref 10–49)
AST SERPL-CCNC: 33 U/L (ref 14–35)
BASOPHILS NFR BLD AUTO: 1.1 %
BUN/CREAT SERPL: 28.75 RATIO (ref 12–20)
CALCIUM SPEC-MCNC: 9.6 MG/DL (ref 8.7–10.3)
CHLORIDE SERPL-SCNC: 105 MMOL/L (ref 96–109)
EOSINOPHIL # BLD AUTO: 0.38 X 10*3/UL (ref 0.04–0.35)
EOSINOPHIL NFR BLD AUTO: 4.1 %
ERYTHROCYTE [DISTWIDTH] IN BLOOD BY AUTOMATED COUNT: 6.03 X 10*6/UL (ref 4.4–5.6)
ERYTHROCYTE [DISTWIDTH] IN BLOOD: 13.5 % (ref 11.5–14.5)
GLOBULIN SER CALC-MCNC: 2.6 G/DL (ref 1.6–3.3)
GLUCOSE SERPL-MCNC: 160 MG/DL (ref 70–110)
HCT VFR BLD AUTO: 54.6 % (ref 39.6–50)
LDLC SERPL CALC-MCNC: 40.7 MG/DL (ref 0–131)
LYMPHOCYTES # SPEC AUTO: 1.47 X 10*3/UL (ref 0.9–5)
LYMPHOCYTES NFR SPEC AUTO: 15.8 %
MCH RBC QN AUTO: 29.9 PG (ref 27–32)
MCV RBC AUTO: 90.5 FL (ref 80–97)
MONOCYTES # BLD AUTO: 1.16 X 10*3/UL (ref 0.2–1)
MONOCYTES NFR BLD AUTO: 12.5 %
NEUTROPHILS # BLD AUTO: 6.14 X 10*3/UL (ref 1.8–7.7)
NEUTROPHILS NFR BLD AUTO: 66.2 %
NRBC BLD AUTO-RTO: 0 X 10*3/UL (ref 0–0.01)
PLATELET # BLD AUTO: 190 X 10*3/UL (ref 140–440)
POTASSIUM SERPL-SCNC: 5.1 MMOL/L (ref 3.5–5.5)
PROT SERPL-MCNC: 6.3 G/DL (ref 6.2–8.2)
PSA SERPL-MCNC: 1.53 NG/ML (ref 0–6.5)
SODIUM SERPL-SCNC: 137 MMOL/L (ref 135–145)
WBC # BLD AUTO: 9.28 X 10*3/UL (ref 4.5–10)

## 2025-05-05 PROCEDURE — 86359 T CELLS TOTAL COUNT: CPT

## 2025-05-05 PROCEDURE — 83036 HEMOGLOBIN GLYCOSYLATED A1C: CPT

## 2025-05-05 PROCEDURE — 86355 B CELLS TOTAL COUNT: CPT

## 2025-05-05 PROCEDURE — 85025 COMPLETE CBC W/AUTO DIFF WBC: CPT

## 2025-05-05 PROCEDURE — 80053 COMPREHEN METABOLIC PANEL: CPT

## 2025-05-05 PROCEDURE — 80074 ACUTE HEPATITIS PANEL: CPT

## 2025-05-05 PROCEDURE — 86357 NK CELLS TOTAL COUNT: CPT

## 2025-05-05 PROCEDURE — 36415 COLL VENOUS BLD VENIPUNCTURE: CPT

## 2025-05-05 PROCEDURE — 86360 T CELL ABSOLUTE COUNT/RATIO: CPT

## 2025-05-05 PROCEDURE — 84443 ASSAY THYROID STIM HORMONE: CPT

## 2025-05-05 PROCEDURE — 80061 LIPID PANEL: CPT

## 2025-05-05 PROCEDURE — 82570 ASSAY OF URINE CREATININE: CPT

## 2025-05-05 PROCEDURE — 84153 ASSAY OF PSA TOTAL: CPT

## 2025-05-05 PROCEDURE — 82043 UR ALBUMIN QUANTITATIVE: CPT

## 2025-05-05 PROCEDURE — 82306 VITAMIN D 25 HYDROXY: CPT

## 2025-05-06 LAB — CD3+CD4+ CELLS/CD3+CD8+ CLL SPEC: 5.1 (ref 1–3.7)
